# Patient Record
Sex: FEMALE | Race: WHITE | NOT HISPANIC OR LATINO | Employment: OTHER | URBAN - METROPOLITAN AREA
[De-identification: names, ages, dates, MRNs, and addresses within clinical notes are randomized per-mention and may not be internally consistent; named-entity substitution may affect disease eponyms.]

---

## 2018-03-20 LAB
25(OH)D3 SERPL-MCNC: 17.94 NG/ML
ALBUMIN SERPL BCP-MCNC: 4.4 G/DL (ref 3.5–5.7)
ALP SERPL-CCNC: 68 IU/L (ref 55–165)
ALT SERPL W P-5'-P-CCNC: 12 IU/L (ref 9–28)
ANION GAP SERPL CALCULATED.3IONS-SCNC: 13.1 MM/L
AST SERPL W P-5'-P-CCNC: 14 U/L (ref 7–26)
BACTERIA UR QL AUTO: ABNORMAL /HPF
BASOPHILS # BLD AUTO: 0.1 X3/UL (ref 0–0.3)
BASOPHILS # BLD AUTO: 0.9 % (ref 0–2)
BILIRUB SERPL-MCNC: 0.4 MG/DL (ref 0.3–1)
BILIRUB UR QL STRIP: NEGATIVE
BUN SERPL-MCNC: 30 MG/DL (ref 7–25)
CALCIUM SERPL-MCNC: 9.4 MG/DL (ref 8.6–10.5)
CHLORIDE SERPL-SCNC: 103 MM/L (ref 98–107)
CHOLEST SERPL-MCNC: 203 MG/DL (ref 0–200)
CLARITY UR: CLEAR
CO2 SERPL-SCNC: 27 MM/L (ref 21–31)
COLOR UR: YELLOW
CREAT SERPL-MCNC: 1.32 MG/DL (ref 0.6–1.2)
DEPRECATED RDW RBC AUTO: 13.8 %
EGFR (HISTORICAL): 39 GFR
EGFR AFRICAN AMERICAN (HISTORICAL): 47 GFR
EOSINOPHIL # BLD AUTO: 0.2 X3/UL (ref 0–0.5)
EOSINOPHIL NFR BLD AUTO: 2.5 % (ref 0–5)
EST. AVERAGE GLUCOSE BLD GHB EST-MCNC: 167 MG/DL
GLUCOSE (HISTORICAL): 197 MG/DL (ref 65–99)
GLUCOSE UR STRIP-MCNC: NEGATIVE MG/DL
HBA1C MFR BLD HPLC: 7.5 % (ref 4–6.2)
HCT VFR BLD AUTO: 39.7 % (ref 37–47)
HDLC SERPL-MCNC: 42 MG/DL (ref 40–60)
HGB BLD-MCNC: 13.2 G/DL (ref 12–16)
HGB UR QL STRIP.AUTO: NEGATIVE
KETONES UR STRIP-MCNC: NEGATIVE MG/DL
LDLC SERPL CALC-MCNC: 92.8 MG/DL (ref 75–193)
LEUKOCYTE ESTERASE UR QL STRIP: ABNORMAL
LYMPHOCYTES # BLD AUTO: 2.6 X3/UL (ref 1.2–4.2)
LYMPHOCYTES NFR BLD AUTO: 34.6 % (ref 20.5–51.1)
MCH RBC QN AUTO: 31.7 PG (ref 26–34)
MCHC RBC AUTO-ENTMCNC: 33.1 G/DL (ref 31–37)
MCV RBC AUTO: 95.6 FL (ref 81–99)
MONOCYTES # BLD AUTO: 0.6 X3/UL (ref 0–1)
MONOCYTES NFR BLD AUTO: 8.4 % (ref 1.7–12)
MUCUS THREADS (HISTORICAL): ABNORMAL
NEUTROPHILS # BLD AUTO: 4 X3/UL (ref 1.4–6.5)
NEUTS SEG NFR BLD AUTO: 53.6 % (ref 42.2–75.2)
NITRITE UR QL STRIP: NEGATIVE
NON-SQ EPI CELLS URNS QL MICRO: ABNORMAL /LPF
OSMOLALITY, SERUM (HISTORICAL): 289 MOSM (ref 262–291)
PH UR STRIP.AUTO: 5.5 [PH] (ref 4.5–8)
PLATELET # BLD AUTO: 333 X3/UL (ref 130–400)
PMV BLD AUTO: 8.9 FL
POTASSIUM SERPL-SCNC: 4.1 MM/L (ref 3.5–5.5)
PROT UR STRIP-MCNC: NEGATIVE MG/DL
RBC # BLD AUTO: 4.15 X6/UL (ref 3.9–5.2)
RBC #/AREA URNS AUTO: ABNORMAL /HPF
SODIUM SERPL-SCNC: 139 MM/L (ref 134–143)
SP GR UR STRIP.AUTO: >= 1.03 (ref 1–1.03)
T4 FREE SERPL-MCNC: 0.92 NG/DL (ref 0.6–1.7)
TOTAL PROTEIN (HISTORICAL): 7.2 G/DL (ref 6.4–8.9)
TRIGL SERPL-MCNC: 343 MG/DL (ref 44–166)
TSH SERPL DL<=0.05 MIU/L-ACNC: 1.26 UIU/M (ref 0.45–5.33)
UROBILINOGEN UR QL STRIP.AUTO: 0.2 EU/DL (ref 0.2–8)
VLDL CHOLESTEROL (HISTORICAL): 69 MG/DL (ref 5–51)
WBC # BLD AUTO: 7.5 X3/UL (ref 4.8–10.8)
WBC #/AREA URNS AUTO: ABNORMAL /HPF

## 2018-07-04 PROBLEM — E78.2 MIXED HYPERLIPIDEMIA: Status: ACTIVE | Noted: 2018-07-04

## 2018-07-04 PROBLEM — E66.9 OBESITY: Status: ACTIVE | Noted: 2018-07-04

## 2018-07-04 PROBLEM — Z98.61 CORONARY ANGIOPLASTY STATUS: Status: ACTIVE | Noted: 2018-07-04

## 2018-07-04 PROBLEM — I47.10 SUPRAVENTRICULAR TACHYCARDIA: Status: ACTIVE | Noted: 2018-07-04

## 2018-07-04 PROBLEM — I47.1 SUPRAVENTRICULAR TACHYCARDIA (HCC): Status: ACTIVE | Noted: 2018-07-04

## 2018-07-04 PROBLEM — E11.9 TYPE 2 DIABETES MELLITUS (HCC): Status: ACTIVE | Noted: 2018-07-04

## 2018-07-04 PROBLEM — I25.10 ATHEROSCLEROTIC HEART DISEASE OF NATIVE CORONARY ARTERY WITHOUT ANGINA PECTORIS: Status: ACTIVE | Noted: 2018-07-04

## 2018-07-04 PROBLEM — I10 ESSENTIAL (PRIMARY) HYPERTENSION: Status: ACTIVE | Noted: 2018-07-04

## 2018-07-04 RX ORDER — LISINOPRIL AND HYDROCHLOROTHIAZIDE 25; 20 MG/1; MG/1
1 TABLET ORAL DAILY
COMMUNITY

## 2018-07-04 RX ORDER — METOPROLOL SUCCINATE 25 MG/1
25 TABLET, EXTENDED RELEASE ORAL DAILY
COMMUNITY
End: 2018-07-09 | Stop reason: DRUGHIGH

## 2018-07-04 RX ORDER — GLYBURIDE-METFORMIN HYDROCHLORIDE 5; 500 MG/1; MG/1
1 TABLET ORAL 2 TIMES DAILY WITH MEALS
COMMUNITY

## 2018-07-09 ENCOUNTER — OFFICE VISIT (OUTPATIENT)
Dept: CARDIOLOGY CLINIC | Facility: CLINIC | Age: 78
End: 2018-07-09
Payer: MEDICARE

## 2018-07-09 VITALS
BODY MASS INDEX: 44.71 KG/M2 | HEIGHT: 58 IN | DIASTOLIC BLOOD PRESSURE: 80 MMHG | HEART RATE: 70 BPM | WEIGHT: 213 LBS | SYSTOLIC BLOOD PRESSURE: 130 MMHG

## 2018-07-09 DIAGNOSIS — Z98.61 CORONARY ANGIOPLASTY STATUS: ICD-10-CM

## 2018-07-09 DIAGNOSIS — I47.1 SUPRAVENTRICULAR TACHYCARDIA (HCC): ICD-10-CM

## 2018-07-09 DIAGNOSIS — I25.10 CORONARY ARTERY DISEASE INVOLVING NATIVE CORONARY ARTERY OF NATIVE HEART WITHOUT ANGINA PECTORIS: Primary | ICD-10-CM

## 2018-07-09 DIAGNOSIS — E78.2 MIXED HYPERLIPIDEMIA: ICD-10-CM

## 2018-07-09 PROCEDURE — 93000 ELECTROCARDIOGRAM COMPLETE: CPT | Performed by: INTERNAL MEDICINE

## 2018-07-09 PROCEDURE — 99214 OFFICE O/P EST MOD 30 MIN: CPT | Performed by: INTERNAL MEDICINE

## 2018-07-09 RX ORDER — METOPROLOL SUCCINATE 25 MG/1
12.5 TABLET, EXTENDED RELEASE ORAL DAILY
COMMUNITY

## 2018-07-09 NOTE — PATIENT INSTRUCTIONS
No changes  Coronary Artery Disease   WHAT YOU SHOULD KNOW:   Coronary artery disease (CAD) is narrowing of the arteries to your heart caused by a buildup of plaque  Plaque is made up of cholesterol and other substances  The narrowing in your arteries decreases the amount of blood that can flow to your heart  This causes your heart to get less oxygen  INSTRUCTIONS:   Medicines: You may  need any of the following:  · Blood pressure medicines  are given to lower your blood pressure  These medicines may include ACE inhibitors and beta-blockers  ACE inhibitors help keep your blood vessels relaxed and open, which helps keep blood flowing into your heart  Beta-blockers keep your heart pumping strongly and regularly  This helps keep your heart from working too hard to get oxygen  · Cholesterol-lowering medicines  help lower high blood cholesterol levels  · Nitrates , such as nitroglycerin, relax the arteries of your heart so it gets more oxygen  They help to relieve your chest pain  · Antiplatelet medicines , such as aspirin, keep platelets from sticking to a damaged part of your artery  Platelets are a part of your blood that stick together to help heal injuries  They may cause a blockage in your artery and keep blood from flowing to your heart  · Anticoagulants    are a type of blood thinner medicine that helps prevent clots  Clots can cause strokes, heart attacks, and death  These medicines may cause you to bleed or bruise more easily  ¨ Watch for bleeding from your gums or nose  Watch for blood in your urine and bowel movements  Use a soft washcloth and a soft toothbrush  If you shave, use an electric razor  Avoid activities that can cause bruising or bleeding  ¨ Tell your healthcare provider about all medicines you take because many medicines cannot be used with anticoagulants  Do not start or stop any medicines unless your healthcare provider tells you to   Tell your dentist and other healthcare providers that you take anticoagulants  Wear a bracelet or necklace that says you take this medicine  ¨ You will need regular blood tests so your healthcare provider can decide how much medicine you need  Take anticoagulants exactly as directed  Tell your healthcare provider right away if you forget to take the medicine, or if you take too much  ¨ If you take warfarin, some foods can change how your blood clots  Do not make major changes to your diet while you take warfarin  Warfarin works best when you eat about the same amount of vitamin K every day  Vitamin K is found in green leafy vegetables, broccoli, grapes, and other foods  Ask for more information about what to eat when you take warfarin  · Take your medicine as directed  Call your healthcare provider if you think your medicine is not helping or if you have side effects  Tell him if you are allergic to any medicine  Keep a list of the medicines, vitamins, and herbs you take  Include the amounts, and when and why you take them  Bring the list or the pill bottles to follow-up visits  Carry your medicine list with you in case of an emergency  Follow up with your primary healthcare provider or cardiologist as directed: You may need to return for other tests  You may also be referred to a cardiac surgeon  Write down your questions so you remember to ask them during your visits  Cardiac rehabilitation:  Your primary healthcare provider or cardiologist may recommend that you attend cardiac rehabilitation (rehab)  This is a program run by specialists who will help you safely strengthen your heart and reduce the risk of more heart disease  The plan includes exercise, relaxation, stress management, and heart-healthy nutrition  Caregivers will also check to make sure any medicines you are taking are working  Manage CAD:   · Exercise regularly  Exercise at least 30 minutes per day, on most days of the week   Exercise helps to lower high cholesterol and high blood pressure  It can also help you to maintain a healthy weight  Ask your primary healthcare provider about the kind of exercise you should do and how to get started  · Maintain a healthy weight  If you are overweight, talk to your primary healthcare provider about how to lose weight  A weight loss of 10% can improve your heart health  · Eat heart-healthy foods  Include fresh fruits and vegetables in your meal plan  Choose low-fat foods, such as skim or 1% fat milk, low-fat cheese and yogurt, fish, chicken (without skin), and lean meats  Eat two 4-ounce servings of fish high in omega-3 fats each week, such as salmon, fresh tuna, and herring  Avoid foods that are high in sodium, such as canned foods, potato chips, salty snacks, and cold cuts  Put less table salt on your food  · Do not smoke  Smoking increases your risk of a heart attack  If you smoke, it is never too late to quit  Ask primary healthcare provider for information if you need help quitting  · Limit alcohol  Women should limit alcohol to 1 drink a day  Men should limit alcohol to 2 drinks a day  A drink of alcohol is 12 ounces of beer, 5 ounces of wine, or 1½ ounces of liquor  · Manage other health conditions  Follow your primary healthcare provider's advice on how to manage other conditions that can affect your heart health  These include diabetes, high blood pressure, and high cholesterol  You may need to take medicines for these conditions and make other lifestyle changes  Talk to your primary healthcare provider if you are depressed  He may recommend treatment for your depression  · Ask if you should have a flu vaccine  The flu can be dangerous for a person who has CAD  The flu vaccine is available every year in the fall         Contact your primary healthcare provider if:   · You have chest pain that is more frequent, or you have chest pain at rest      · You have questions or concerns about your condition or care  Return to the emergency department if:  You have any of the following signs of a heart attack:  · Squeezing, pressure, fullness, or pain in your chest that lasts longer than a few minutes or returns     · Discomfort or pain in your back, neck, jaw, stomach, or arm    · Shortness of breath or breathing problems    · A sudden cold sweat, lightheadedness, dizziness, or nausea, especially with chest pain or trouble breathing  © 2014 [address] is for End User's use only and may not be sold, redistributed or otherwise used for commercial purposes  All illustrations and images included in CareNotes® are the copyrighted property of A [employer] A M [employer]  or [employer]ian [employer]  The above information is an  only  It is not intended as medical advice for individual conditions or treatments  Talk to your doctor, nurse or pharmacist before following any medical regimen to see if it is safe and effective for you

## 2018-07-09 NOTE — PROGRESS NOTES
Subjective:        Patient ID: Renate Rubio is a 78 y o  female  Chief Complaint:  I am happy to report that Mariya Roldan is feeling well not having had any alarming cardiac symptoms since our last visit  She does all of her ADLs, including carrying groceries, carrying laundry, and vacuuming her house without any chest pain, dyspnea, palpitations, presyncope nor syncope  Her EKG is stable, sinus rhythm, leftward axis with a single PAC, no alarming ST changes  She tells me recently put her on new anti-lipid therapy, she cannot recall the name, I saw that her triglycerides are elevated on recent profile  She is trying to watch her dairy and diet a little bit better as well  Coronary Artery Disease   Symptoms include palpitations  Pertinent negatives include no chest pain, dizziness, leg swelling, muscle weakness, shortness of breath or weight gain  Risk factors include hyperlipidemia  Rapid Heart Rate    Pertinent negatives include no anxiety, chest pain, coughing, diaphoresis, dizziness, fever, irregular heartbeat, malaise/fatigue, nausea, near-syncope, numbness, shortness of breath, syncope, vomiting or weakness  Hypertension   Associated symptoms include palpitations  Pertinent negatives include no blurred vision, chest pain, headaches, malaise/fatigue, orthopnea, PND or shortness of breath  Hyperlipidemia   Pertinent negatives include no chest pain, focal weakness, myalgias or shortness of breath  The following portions of the patient's history were reviewed and updated as appropriate: allergies, current medications, past family history, past medical history, past social history, past surgical history and problem list   Review of Systems   Constitution: Negative  Negative for diaphoresis, fever, weakness, malaise/fatigue, night sweats, weight gain and weight loss  HENT: Negative  Negative for congestion, nosebleeds, stridor and tinnitus  Eyes: Negative    Negative for blurred vision, double vision, photophobia, vision loss in left eye, vision loss in right eye and visual disturbance  Cardiovascular: Positive for palpitations  Negative for chest pain, claudication, cyanosis, dyspnea on exertion, irregular heartbeat, leg swelling, near-syncope, orthopnea, paroxysmal nocturnal dyspnea and syncope  Respiratory: Negative  Negative for cough, hemoptysis, shortness of breath, sleep disturbances due to breathing, snoring, sputum production and wheezing  Endocrine: Negative  Negative for cold intolerance and heat intolerance  Hematologic/Lymphatic: Negative  Negative for adenopathy and bleeding problem  Does not bruise/bleed easily  Skin: Negative  Negative for color change, dry skin, flushing, itching, nail changes and rash  Musculoskeletal: Positive for arthritis and joint pain  Negative for back pain, muscle cramps, muscle weakness, myalgias and stiffness  She has a little bit of difficulty with steps, she attributes to age an arthritic issues  Gastrointestinal: Negative  Negative for bloating, abdominal pain, anorexia, bowel incontinence, constipation, diarrhea, dysphagia, excessive appetite, heartburn, hematemesis, hematochezia, melena, nausea and vomiting  Genitourinary: Negative  Negative for bladder incontinence, hematuria, nocturia, non-menstrual bleeding and urgency  Neurological: Negative  Negative for excessive daytime sleepiness, dizziness, focal weakness, headaches, light-headedness, loss of balance, numbness, paresthesias and tremors  Psychiatric/Behavioral: Negative  Negative for altered mental status, depression, memory loss and substance abuse  The patient is not nervous/anxious  Allergic/Immunologic: Negative  Negative for environmental allergies and hives  Objective:     Physical Exam   Constitutional: She is oriented to person, place, and time  She appears well-developed and well-nourished  HENT:   Head: Normocephalic and atraumatic  Eyes: EOM are normal  Pupils are equal, round, and reactive to light  Neck: Normal range of motion  Neck supple  No JVD present  No thyromegaly present  Cardiovascular: Normal rate, regular rhythm, normal heart sounds and intact distal pulses  Exam reveals no gallop and no friction rub  No murmur heard  Pulmonary/Chest: Effort normal and breath sounds normal  No stridor  No respiratory distress  She has no wheezes  She has no rales  Abdominal: Soft  Bowel sounds are normal  She exhibits no mass  There is no tenderness  Musculoskeletal: Normal range of motion  She exhibits no edema  Lymphadenopathy:     She has no cervical adenopathy  Neurological: She is alert and oriented to person, place, and time  Skin: Skin is warm and dry  No rash noted  No pallor  Psychiatric: She has a normal mood and affect  Her behavior is normal  Judgment and thought content normal    Nursing note and vitals reviewed  Lab Review:   No visits with results within 2 Month(s) from this visit     Latest known visit with results is:   Orders Only on 03/20/2018   Component Date Value    WBC 03/20/2018 7 5     RBC 03/20/2018 4 15     Hemoglobin 03/20/2018 13 2     Hematocrit 03/20/2018 39 7     MCV 03/20/2018 95 6     MCH 03/20/2018 31 7     MCHC 03/20/2018 33 1     RDW 03/20/2018 13 8     Platelets 16/93/8847 333     MPV 03/20/2018 8 9     Neutrophils Relative 03/20/2018 53 6     Lymphocytes Relative 03/20/2018 34 6     Monocytes Relative 03/20/2018 8 4     Eosinophils Relative 03/20/2018 2 5     Basophils Relative 03/20/2018 0 9     Neutrophils Absolute 03/20/2018 4 0     Lymphocytes Absolute 03/20/2018 2 6     Monocytes Absolute 03/20/2018 0 6     Eosinophils Absolute 03/20/2018 0 2     Basophils Absolute 03/20/2018 0 1     Color, UA 03/20/2018 YELLOW     Clarity, UA 03/20/2018 CLEAR     Specific Gravity, UA 03/20/2018 >= 1 030     pH, UA 03/20/2018 5 5     Glucose, UA 03/20/2018 NEGATIVE     Bilirubin, UA 03/20/2018 NEGATIVE     Ketones, UA 03/20/2018 NEGATIVE     Protein, UA 03/20/2018 NEGATIVE     Urobilinogen, UA 03/20/2018 0 2     Nitrite, UA 03/20/2018 NEGATIVE     Blood, UA 03/20/2018 NEGATIVE     Leukocytes, UA 03/20/2018 TRACE*    WBC, UA 03/20/2018 0 - 2     RBC, UA 03/20/2018 0 - 2     Bacteria, UA 03/20/2018 FEW*    MUCUS THREADS (HISTORICA* 03/20/2018 NONE SEEN     Epithelial Cells 03/20/2018 FEW     Glucose 03/20/2018 197*    BUN 03/20/2018 30*    Creatinine 03/20/2018 1 32*    Sodium 03/20/2018 139     Potassium 03/20/2018 4 1     Chloride 03/20/2018 103     CO2 03/20/2018 27     Calcium 03/20/2018 9 4     Anion Gap 03/20/2018 13 1     OSMOLALITY, SERUM 03/20/2018 289     Total Protein 03/20/2018 7 2     Albumin 03/20/2018 4 4     Total Bilirubin 03/20/2018 0 4     AST 03/20/2018 14     ALT 03/20/2018 12     Alkaline Phosphatase 03/20/2018 68     EGFR (HISTORICAL) 03/20/2018 39*    eGFR  03/20/2018 47*    Cholesterol 03/20/2018 203*    Triglycerides 03/20/2018 343*    VLDL CHOLESTEROL 03/20/2018 69 0*    HDL 03/20/2018 42     LDL Calculated 03/20/2018 92 8     Hemoglobin A1C 03/20/2018 7 5*    EAG 03/20/2018 167     TSH 3RD GENERATON 03/20/2018 1 26     Free T4 03/20/2018 0 92     Vit D, 25-Hydroxy 03/20/2018 17 94          Assessment:       1  Coronary artery disease involving native coronary artery of native heart without angina pectoris  POCT ECG   2  Coronary angioplasty status     3  Mixed hyperlipidemia     4  Supraventricular tachycardia ([address] )          Plan:       1  Danni Ruiz had single-vessel CAD via 2010 catheterization receiving an LAD stent and kissing balloon diagonal angioplasty and has done well since  She is angina free without any signs or symptoms of heart failure nor electrical instability  Medications ideal, I am glad you have prescribed something for her dyslipidemia though her LDLs are not bad      2   There is no evidence for recurrent SVT symptomatic we or electrocardiographically, I do not advise any changes here  We briefly visited surveillance stress testing, we would both like to hold off at this point time however if any elective surgery need be scheduled I would advocate preoperative stress testing should anything be of intermediate or higher risk  Her meds seem optimal, I made no changes today and order no testing today  She will give a call should any cardiac symptoms such as progressive dyspnea, of course any chest pain or tightness or extreme fatigue arise prior to her 6 month follow-up visit with me    Let me know if you need me to see her sooner

## 2019-02-04 ENCOUNTER — TRANSCRIBE ORDERS (OUTPATIENT)
Dept: ADMINISTRATIVE | Facility: HOSPITAL | Age: 79
End: 2019-02-04

## 2019-02-04 ENCOUNTER — APPOINTMENT (OUTPATIENT)
Dept: LAB | Facility: HOSPITAL | Age: 79
End: 2019-02-04
Attending: INTERNAL MEDICINE
Payer: COMMERCIAL

## 2019-02-04 DIAGNOSIS — E55.9 VITAMIN D DEFICIENCY: Primary | ICD-10-CM

## 2019-02-04 DIAGNOSIS — E78.89 LIPIDS ABNORMAL: ICD-10-CM

## 2019-02-04 DIAGNOSIS — Z79.4 TYPE 2 DIABETES MELLITUS WITHOUT COMPLICATION, WITH LONG-TERM CURRENT USE OF INSULIN (HCC): ICD-10-CM

## 2019-02-04 DIAGNOSIS — E11.9 TYPE 2 DIABETES MELLITUS WITHOUT COMPLICATION, WITH LONG-TERM CURRENT USE OF INSULIN (HCC): ICD-10-CM

## 2019-02-04 DIAGNOSIS — E55.9 VITAMIN D DEFICIENCY: ICD-10-CM

## 2019-02-04 DIAGNOSIS — I10 HYPERTENSION, UNSPECIFIED TYPE: ICD-10-CM

## 2019-02-04 LAB
25(OH)D3 SERPL-MCNC: 16.5 NG/ML (ref 30–100)
ALBUMIN SERPL BCP-MCNC: 4.4 G/DL (ref 3.5–5.7)
ALP SERPL-CCNC: 67 U/L (ref 55–165)
ALT SERPL W P-5'-P-CCNC: 10 U/L (ref 7–52)
ANION GAP SERPL CALCULATED.3IONS-SCNC: 12 MMOL/L (ref 4–13)
AST SERPL W P-5'-P-CCNC: 14 U/L (ref 13–39)
BACTERIA UR QL AUTO: ABNORMAL /HPF
BASOPHILS # BLD AUTO: 0.1 THOUSANDS/ΜL (ref 0–0.1)
BASOPHILS NFR BLD AUTO: 1 % (ref 0–1)
BILIRUB SERPL-MCNC: 0.3 MG/DL (ref 0.2–1)
BILIRUB UR QL STRIP: NEGATIVE
BUN SERPL-MCNC: 39 MG/DL (ref 7–25)
CALCIUM SERPL-MCNC: 9.9 MG/DL (ref 8.6–10.5)
CHLORIDE SERPL-SCNC: 105 MMOL/L (ref 98–107)
CHOLEST SERPL-MCNC: 216 MG/DL (ref 0–200)
CLARITY UR: ABNORMAL
CO2 SERPL-SCNC: 23 MMOL/L (ref 21–31)
COLOR UR: YELLOW
CREAT SERPL-MCNC: 1.26 MG/DL (ref 0.6–1.2)
CREAT UR-MCNC: 132 MG/DL
EOSINOPHIL # BLD AUTO: 0.2 THOUSAND/ΜL (ref 0–0.61)
EOSINOPHIL NFR BLD AUTO: 2 % (ref 0–6)
ERYTHROCYTE [DISTWIDTH] IN BLOOD BY AUTOMATED COUNT: 14.1 % (ref 11.6–15.1)
EST. AVERAGE GLUCOSE BLD GHB EST-MCNC: 186 MG/DL
GFR SERPL CREATININE-BSD FRML MDRD: 41 ML/MIN/1.73SQ M
GLUCOSE P FAST SERPL-MCNC: 120 MG/DL (ref 65–99)
GLUCOSE UR STRIP-MCNC: NEGATIVE MG/DL
HBA1C MFR BLD: 8.1 % (ref 4.2–6.3)
HCT VFR BLD AUTO: 40.7 % (ref 37–47)
HDLC SERPL-MCNC: 46 MG/DL (ref 40–60)
HGB BLD-MCNC: 13.1 G/DL (ref 11.5–15.4)
HGB UR QL STRIP.AUTO: ABNORMAL
KETONES UR STRIP-MCNC: NEGATIVE MG/DL
LEUKOCYTE ESTERASE UR QL STRIP: ABNORMAL
LYMPHOCYTES # BLD AUTO: 3.3 THOUSANDS/ΜL (ref 0.6–4.47)
LYMPHOCYTES NFR BLD AUTO: 40 % (ref 14–44)
MCH RBC QN AUTO: 30.8 PG (ref 26.8–34.3)
MCHC RBC AUTO-ENTMCNC: 32.3 G/DL (ref 31.4–37.4)
MCV RBC AUTO: 95 FL (ref 82–98)
MICROALBUMIN UR-MCNC: 41.9 MG/L (ref 0–20)
MICROALBUMIN/CREAT 24H UR: 32 MG/G CREATININE (ref 0–30)
MONOCYTES # BLD AUTO: 0.7 THOUSAND/ΜL (ref 0.17–1.22)
MONOCYTES NFR BLD AUTO: 9 % (ref 4–12)
MUCOUS THREADS UR QL AUTO: ABNORMAL
NEUTROPHILS # BLD AUTO: 4 THOUSANDS/ΜL (ref 1.85–7.62)
NEUTS SEG NFR BLD AUTO: 48 % (ref 43–75)
NITRITE UR QL STRIP: POSITIVE
NON-SQ EPI CELLS URNS QL MICRO: ABNORMAL /HPF
NONHDLC SERPL-MCNC: 170 MG/DL
NRBC BLD AUTO-RTO: 0 /100 WBCS
PH UR STRIP.AUTO: 5.5 [PH] (ref 5–8)
PLATELET # BLD AUTO: 317 THOUSANDS/UL (ref 149–390)
PMV BLD AUTO: 8.4 FL (ref 8.9–12.7)
POTASSIUM SERPL-SCNC: 4.5 MMOL/L (ref 3.5–5.5)
PROT SERPL-MCNC: 7.1 G/DL (ref 6.4–8.9)
PROT UR STRIP-MCNC: NEGATIVE MG/DL
RBC # BLD AUTO: 4.27 MILLION/UL (ref 3.81–5.12)
RBC #/AREA URNS AUTO: ABNORMAL /HPF
SODIUM SERPL-SCNC: 140 MMOL/L (ref 134–143)
SP GR UR STRIP.AUTO: >=1.03 (ref 1–1.03)
T4 FREE SERPL-MCNC: 1.03 NG/DL (ref 0.76–1.46)
TRIGL SERPL-MCNC: 515 MG/DL (ref 44–166)
TSH SERPL DL<=0.05 MIU/L-ACNC: 1.02 UIU/ML (ref 0.45–5.33)
UROBILINOGEN UR QL STRIP.AUTO: 0.2 E.U./DL
VIT B12 SERPL-MCNC: 145 PG/ML (ref 100–900)
WBC # BLD AUTO: 8.2 THOUSAND/UL (ref 4.31–10.16)
WBC #/AREA URNS AUTO: ABNORMAL /HPF

## 2019-02-04 PROCEDURE — 80053 COMPREHEN METABOLIC PANEL: CPT

## 2019-02-04 PROCEDURE — 82306 VITAMIN D 25 HYDROXY: CPT

## 2019-02-04 PROCEDURE — 85025 COMPLETE CBC W/AUTO DIFF WBC: CPT

## 2019-02-04 PROCEDURE — 84439 ASSAY OF FREE THYROXINE: CPT

## 2019-02-04 PROCEDURE — 82607 VITAMIN B-12: CPT

## 2019-02-04 PROCEDURE — 84443 ASSAY THYROID STIM HORMONE: CPT

## 2019-02-04 PROCEDURE — 80061 LIPID PANEL: CPT

## 2019-02-04 PROCEDURE — 82043 UR ALBUMIN QUANTITATIVE: CPT

## 2019-02-04 PROCEDURE — 81001 URINALYSIS AUTO W/SCOPE: CPT | Performed by: INTERNAL MEDICINE

## 2019-02-04 PROCEDURE — 83036 HEMOGLOBIN GLYCOSYLATED A1C: CPT

## 2019-02-04 PROCEDURE — 36415 COLL VENOUS BLD VENIPUNCTURE: CPT

## 2019-02-04 PROCEDURE — 82570 ASSAY OF URINE CREATININE: CPT

## 2019-05-06 ENCOUNTER — TRANSCRIBE ORDERS (OUTPATIENT)
Dept: ADMINISTRATIVE | Facility: HOSPITAL | Age: 79
End: 2019-05-06

## 2019-05-06 DIAGNOSIS — M51.26 LUMBAR DISC HERNIATION: Primary | ICD-10-CM

## 2019-05-14 ENCOUNTER — HOSPITAL ENCOUNTER (OUTPATIENT)
Dept: MRI IMAGING | Facility: HOSPITAL | Age: 79
Discharge: HOME | End: 2019-05-14
Attending: ORTHOPAEDIC SURGERY
Payer: COMMERCIAL

## 2019-05-14 DIAGNOSIS — M51.26 LUMBAR DISC HERNIATION: ICD-10-CM

## 2019-05-14 PROCEDURE — 72148 MRI LUMBAR SPINE W/O DYE: CPT

## 2019-05-24 ENCOUNTER — OFFICE VISIT (OUTPATIENT)
Dept: CARDIOLOGY CLINIC | Facility: CLINIC | Age: 79
End: 2019-05-24
Payer: COMMERCIAL

## 2019-05-24 VITALS
WEIGHT: 205 LBS | HEART RATE: 72 BPM | BODY MASS INDEX: 43.03 KG/M2 | SYSTOLIC BLOOD PRESSURE: 148 MMHG | DIASTOLIC BLOOD PRESSURE: 82 MMHG | HEIGHT: 58 IN

## 2019-05-24 DIAGNOSIS — I47.1 SUPRAVENTRICULAR TACHYCARDIA (HCC): ICD-10-CM

## 2019-05-24 DIAGNOSIS — I10 ESSENTIAL (PRIMARY) HYPERTENSION: ICD-10-CM

## 2019-05-24 DIAGNOSIS — Z98.61 CORONARY ANGIOPLASTY STATUS: Primary | ICD-10-CM

## 2019-05-24 DIAGNOSIS — E78.2 MIXED HYPERLIPIDEMIA: ICD-10-CM

## 2019-05-24 PROCEDURE — 99214 OFFICE O/P EST MOD 30 MIN: CPT | Performed by: INTERNAL MEDICINE

## 2019-05-24 RX ORDER — INSULIN GLARGINE 100 [IU]/ML
INJECTION, SOLUTION SUBCUTANEOUS
COMMUNITY
Start: 2019-05-14

## 2019-05-24 RX ORDER — GLIPIZIDE 5 MG/1
5 TABLET ORAL 2 TIMES DAILY
COMMUNITY
Start: 2019-05-20

## 2019-05-24 RX ORDER — SIMVASTATIN 40 MG
40 TABLET ORAL DAILY
COMMUNITY
Start: 2019-04-16 | End: 2021-06-18 | Stop reason: ALTCHOICE

## 2019-07-08 ENCOUNTER — HOSPITAL ENCOUNTER (OUTPATIENT)
Dept: NON INVASIVE DIAGNOSTICS | Facility: CLINIC | Age: 79
Discharge: HOME | End: 2019-07-08
Payer: COMMERCIAL

## 2019-07-08 DIAGNOSIS — I47.1 SUPRAVENTRICULAR TACHYCARDIA (HCC): ICD-10-CM

## 2019-07-08 DIAGNOSIS — Z98.61 CORONARY ANGIOPLASTY STATUS: ICD-10-CM

## 2019-07-08 DIAGNOSIS — I10 ESSENTIAL (PRIMARY) HYPERTENSION: ICD-10-CM

## 2019-07-08 PROCEDURE — 93306 TTE W/DOPPLER COMPLETE: CPT | Performed by: INTERNAL MEDICINE

## 2019-07-08 PROCEDURE — 93306 TTE W/DOPPLER COMPLETE: CPT

## 2020-02-10 ENCOUNTER — OFFICE VISIT (OUTPATIENT)
Dept: CARDIOLOGY CLINIC | Facility: CLINIC | Age: 80
End: 2020-02-10
Payer: COMMERCIAL

## 2020-02-10 VITALS
WEIGHT: 208 LBS | SYSTOLIC BLOOD PRESSURE: 124 MMHG | DIASTOLIC BLOOD PRESSURE: 72 MMHG | HEIGHT: 58 IN | HEART RATE: 75 BPM | BODY MASS INDEX: 43.66 KG/M2

## 2020-02-10 DIAGNOSIS — E78.2 MIXED HYPERLIPIDEMIA: ICD-10-CM

## 2020-02-10 DIAGNOSIS — I10 ESSENTIAL (PRIMARY) HYPERTENSION: ICD-10-CM

## 2020-02-10 DIAGNOSIS — I47.1 SUPRAVENTRICULAR TACHYCARDIA (HCC): ICD-10-CM

## 2020-02-10 DIAGNOSIS — Z95.5 H/O HEART ARTERY STENT: ICD-10-CM

## 2020-02-10 DIAGNOSIS — I25.10 CORONARY ARTERY DISEASE INVOLVING NATIVE CORONARY ARTERY OF NATIVE HEART WITHOUT ANGINA PECTORIS: Primary | ICD-10-CM

## 2020-02-10 PROCEDURE — 99214 OFFICE O/P EST MOD 30 MIN: CPT | Performed by: INTERNAL MEDICINE

## 2020-02-10 PROCEDURE — 93000 ELECTROCARDIOGRAM COMPLETE: CPT | Performed by: INTERNAL MEDICINE

## 2020-02-10 RX ORDER — ICOSAPENT ETHYL 1000 MG/1
1 CAPSULE ORAL 2 TIMES DAILY
Qty: 180 CAPSULE | Refills: 3 | Status: SHIPPED | OUTPATIENT
Start: 2020-02-10 | End: 2021-06-18 | Stop reason: SDUPTHER

## 2020-02-10 NOTE — PATIENT INSTRUCTIONS
Coronary Artery Disease   AMBULATORY CARE:   Coronary artery disease (CAD)  is narrowing of the arteries to your heart caused by a buildup of plaque  Plaque is made up of cholesterol and other substances  The narrowing in your arteries decreases the amount of blood that can flow to your heart  This causes your heart to get less oxygen  You may not have any symptoms of CAD  It is important for you to manage CAD even if you feel well  CAD can lead to a heart attack if it is not managed  Common symptoms include the following:   · Chest pain (angina), causing burning, squeezing, or crushing tightness in your chest    · Pain that spreads to your neck, jaw, or shoulder blade    · Nausea, vomiting, sweating, fainting, and hands and feet that are cold to the touch  Call 911 for any of the following:   · You have any of the following signs of a heart attack:      ¨ Squeezing, pressure, or pain in your chest that lasts longer than 5 minutes or returns    ¨ Discomfort or pain in your back, neck, jaw, stomach, or arm     ¨ Trouble breathing    ¨ Nausea or vomiting    ¨ Lightheadedness or a sudden cold sweat, especially with chest pain or trouble breathing    Contact your healthcare provider if:   · You have chest pain that is more frequent, or you have chest pain at rest     · You have questions or concerns about your condition or care  Medicines used to treat CAD:   · Blood pressure medicines  are given to lower your blood pressure  ACE inhibitors help keep your blood vessels relaxed and open to help keep blood flowing into your heart  Beta-blockers keep your heart pumping strongly and regularly so it does not work too hard to get oxygen  · Cholesterol medicines  help lower blood cholesterol levels  · Nitrates , such as nitroglycerin, relax the arteries of your heart so it gets more oxygen  They help to relieve your chest pain  · Antiplatelets , such as aspirin, help prevent blood clots   Take your antiplatelet medicine exactly as directed  These medicines make it more likely for you to bleed or bruise  If you are told to take aspirin, do not take acetaminophen or ibuprofen instead  · Blood thinners  keep clots from forming in your blood  Clots may cause heart attacks, strokes, or death  This medicine makes it more likely for you to bleed or bruise  · Do not take certain medicines without asking your healthcare provider first   These include NSAIDs, herbal or vitamin supplements, or hormones (estrogen or progestin)  Procedures used to treat CAD:   · Angioplasty  may be done to open an artery blocked by plaque  A tube with a balloon on the end is threaded into the blocked artery  Once the tube is in the artery, the balloon is inflated  As the balloon inflates, it presses the plaque against the artery wall to open the artery  A stent may be placed in your artery to keep it open  · Coronary artery bypass graft surgery (CABG)  is open heart surgery  Healthcare providers take arteries or veins from other areas in your body and use them to bypass or go around the blocked arteries of your heart  Cardiac rehabilitation:  Your healthcare provider may recommend that you attend cardiac rehabilitation (rehab)  This is a program run by specialists who will help you safely strengthen your heart and reduce the risk for more heart disease  The plan includes exercise, relaxation, stress management, and heart-healthy nutrition  Healthcare providers will also check to make sure any medicines you are taking are working  Manage CAD to prevent a heart attack:   · Do not smoke  Nicotine and other chemicals in cigarettes and cigars can cause heart and lung damage  Ask your healthcare provider for information if you currently smoke and need help to quit  E-cigarettes or smokeless tobacco still contain nicotine  Talk to your healthcare provider before you use these products  · Exercise regularly    Exercise at least 30 minutes each day, on most days of the week  Exercise helps to lower high cholesterol and high blood pressure  It can also help you maintain a healthy weight  Ask your healthcare provider about the kind of exercise you should do and how to get started  · Maintain a healthy weight  If you are overweight, talk to your healthcare provider about how to lose weight  A weight loss of 10% can improve your heart health  · Eat heart-healthy foods  Include fresh fruits and vegetables in your meal plan  Choose low-fat foods, such as skim or 1% fat milk, low-fat cheese and yogurt, fish, chicken (without skin), and lean meats  Eat two 4-ounce servings of fish high in omega-3 fats each week, such as salmon, fresh tuna, and herring  Do not eat foods that are high in sodium, such as canned foods, potato chips, salty snacks, and cold cuts  Put less table salt on your food  · Limit or do not drink alcohol  A drink of alcohol is 12 ounces of beer, 5 ounces of wine, or 1½ ounces of liquor  · Manage other health conditions  Follow your healthcare provider's advice on how to manage other conditions that can affect your heart health  These include diabetes, high blood pressure, and high cholesterol  You may need to take medicines for these conditions and make other lifestyle changes  · Ask if you should have a flu vaccine  The flu can be dangerous for a person who has CAD  The flu vaccine is available every year in the fall  Follow up with your healthcare provider as directed: You may need to return for other tests  You may also be referred to a cardiac surgeon  Write down your questions so you remember to ask them during your visits  © 2017 [address]  Information is for End User's use only and may not be sold, redistributed or otherwise used for commercial purposes  All illustrations and images included in CareNotes® are the copyrighted property of [employer]  or [employer]    The above information is an  only  It is not intended as medical advice for individual conditions or treatments  Talk to your doctor, nurse or pharmacist before following any medical regimen to see if it is safe and effective for you

## 2020-02-10 NOTE — PROGRESS NOTES
Subjective:        Patient ID: Kelly Conn is a 70 y o  female  Chief Complaint:  Mason Marcelo is here for routine follow-up  As you know she has a history of CAD status post remote LAD stenting and kissing angioplasty  She remains angina free  She says she thinks the that Vascepa I prescribed is now only 9 bucks for 3 months  For unclear reasons she did not fill it  Asked me to reorder it  EKG shows normal sinus rhythm leftward axis otherwise unremarkable 75 beats per minute  She examines euvolemic  She has a grade 1 outflow murmur  Reviewed her echocardiogram from the summer revealing trivial aortic stenosis consistent with her heard murmur, and normal to hyperdynamic LV systolic function and no wall motion abnormality  As you know her triglycerides are over 500  Recent A1c improved nearing 7 now  The following portions of the patient's history were reviewed and updated as appropriate: allergies, current medications, past family history, past medical history, past social history, past surgical history and problem list   Review of Systems   Constitution: Negative for chills, diaphoresis, malaise/fatigue and weight gain  HENT: Negative for nosebleeds and stridor  Eyes: Negative for double vision, vision loss in left eye, vision loss in right eye and visual disturbance  Cardiovascular: Negative for chest pain, claudication, cyanosis, dyspnea on exertion, irregular heartbeat, leg swelling, near-syncope, orthopnea, palpitations, paroxysmal nocturnal dyspnea and syncope  Respiratory: Negative for cough, shortness of breath, snoring and wheezing  Endocrine: Negative for polydipsia, polyphagia and polyuria  Hematologic/Lymphatic: Negative for bleeding problem  Does not bruise/bleed easily  Skin: Negative for flushing and rash  Musculoskeletal: Positive for arthritis and stiffness  Negative for falls and myalgias     Gastrointestinal: Negative for abdominal pain, heartburn, hematemesis, hematochezia, melena and nausea  Genitourinary: Negative for hematuria  Neurological: Negative for brief paralysis, dizziness, focal weakness, headaches, light-headedness, loss of balance and vertigo  Psychiatric/Behavioral: Negative for altered mental status and substance abuse  Allergic/Immunologic: Negative for hives  Objective:      /72   Pulse 75   Ht 4' 10" (1 473 m)   Wt 94 3 kg (208 lb)   BMI 43 47 kg/m²   Physical Exam   Constitutional: She is oriented to person, place, and time  She appears well-developed and well-nourished  HENT:   Head: Normocephalic and atraumatic  Eyes: Pupils are equal, round, and reactive to light  EOM are normal    Neck: Normal range of motion  Neck supple  No JVD present  No thyromegaly present  Cardiovascular: Normal rate, regular rhythm and intact distal pulses  Exam reveals no gallop and no friction rub  Murmur (Grade 1 outflow murmur short length at base) heard  Pulmonary/Chest: Effort normal and breath sounds normal  No stridor  No respiratory distress  She has no wheezes  She has no rales  Abdominal: Soft  Bowel sounds are normal  She exhibits no mass  There is no tenderness  Musculoskeletal: Normal range of motion  She exhibits no edema  Lymphadenopathy:     She has no cervical adenopathy  Neurological: She is alert and oriented to person, place, and time  Skin: Skin is warm and dry  No rash noted  No pallor  Psychiatric: She has a normal mood and affect  Her behavior is normal  Judgment and thought content normal    Nursing note and vitals reviewed  Lab Review:   No visits with results within 6 Month(s) from this visit     Latest known visit with results is:   Appointment on 02/04/2019   Component Date Value    WBC 02/04/2019 8 20     RBC 02/04/2019 4 27     Hemoglobin 02/04/2019 13 1     Hematocrit 02/04/2019 40 7     MCV 02/04/2019 95     [address] 02/04/2019 30 8     MCHC 02/04/2019 32 3     RDW 02/04/2019 14 1     MPV 02/04/2019 8 4*    Platelets 53/78/2550 317     nRBC 02/04/2019 0     Neutrophils Relative 02/04/2019 48     Lymphocytes Relative 02/04/2019 40     Monocytes Relative 02/04/2019 9     Eosinophils Relative 02/04/2019 2     Basophils Relative 02/04/2019 1     Neutrophils Absolute 02/04/2019 4 00     Lymphocytes Absolute 02/04/2019 3 30     Monocytes Absolute 02/04/2019 0 70     Eosinophils Absolute 02/04/2019 0 20     Basophils Absolute 02/04/2019 0 10     Sodium 02/04/2019 140     Potassium 02/04/2019 4 5     Chloride 02/04/2019 105     CO2 02/04/2019 23     ANION GAP 02/04/2019 12     BUN 02/04/2019 39*    Creatinine 02/04/2019 1 26*    Glucose, Fasting 02/04/2019 120*    Calcium 02/04/2019 9 9     AST 02/04/2019 14     ALT 02/04/2019 10     Alkaline Phosphatase 02/04/2019 67     Total Protein 02/04/2019 7 1     Albumin 02/04/2019 4 4     Total Bilirubin 02/04/2019 0 30     eGFR 02/04/2019 41     Cholesterol 02/04/2019 216*    Triglycerides 02/04/2019 515*    HDL, Direct 02/04/2019 46     LDL Calculated 02/04/2019      Non-HDL-Chol (CHOL-HDL) 02/04/2019 170     Free T4 02/04/2019 1 03     TSH 3RD GENERATON 02/04/2019 1 020     Hemoglobin A1C 02/04/2019 8 1*    EAG 02/04/2019 186     Vit D, 25-Hydroxy 02/04/2019 16 5*    Creatinine, Ur 02/04/2019 132 0     Microalbum  ,U,Random 02/04/2019 41 9*    Microalb Creat Ratio 02/04/2019 32*    Vitamin B-12 02/04/2019 145      No results found  Assessment:       1  Coronary artery disease involving native coronary artery of native heart without angina pectoris  POCT ECG    Icosapent Ethyl (VASCEPA) 1 g CAPS    Triglycerides   2  H/O heart artery stent     3  Supraventricular tachycardia ([address] )     4  Mixed hyperlipidemia  Icosapent Ethyl (VASCEPA) 1 g CAPS    Triglycerides   5   Essential (primary) hypertension          Plan:       Chronic is without any signs or symptoms reminiscent of angina pectoris, heart failure nor electrical instability  Blood pressure well controlled  I represcribed Vascepa again  She will call with any side effects and stop it  Fasting triglycerides ordered in approximately 2 months time  Remainder of her cardiac regimen reviewed in detail today is optimal I recommended no other changes  I will see her back in 6 months, she will call sooner with any concerning symptoms in the meantime  Certainly should any thing more than low risk elective surgery be planned I would ask that you give me minesh out as we should entertain stress testing at that point

## 2020-08-10 ENCOUNTER — APPOINTMENT (OUTPATIENT)
Dept: LAB | Facility: CLINIC | Age: 80
End: 2020-08-10
Payer: COMMERCIAL

## 2020-08-10 ENCOUNTER — TRANSCRIBE ORDERS (OUTPATIENT)
Dept: URGENT CARE | Facility: CLINIC | Age: 80
End: 2020-08-10

## 2020-08-10 DIAGNOSIS — E78.2 MIXED HYPERLIPIDEMIA: ICD-10-CM

## 2020-08-10 DIAGNOSIS — I10 ESSENTIAL HYPERTENSION: ICD-10-CM

## 2020-08-10 DIAGNOSIS — E11.9 TYPE 2 DIABETES MELLITUS WITHOUT COMPLICATION, WITHOUT LONG-TERM CURRENT USE OF INSULIN (HCC): Primary | ICD-10-CM

## 2020-08-10 DIAGNOSIS — E11.9 TYPE 2 DIABETES MELLITUS WITHOUT COMPLICATION, WITHOUT LONG-TERM CURRENT USE OF INSULIN (HCC): ICD-10-CM

## 2020-08-10 LAB
ALBUMIN SERPL BCP-MCNC: 3.6 G/DL (ref 3.5–5)
ALP SERPL-CCNC: 71 U/L (ref 46–116)
ALT SERPL W P-5'-P-CCNC: 18 U/L (ref 12–78)
ANION GAP SERPL CALCULATED.3IONS-SCNC: 3 MMOL/L (ref 4–13)
AST SERPL W P-5'-P-CCNC: 13 U/L (ref 5–45)
BACTERIA UR QL AUTO: ABNORMAL /HPF
BASOPHILS # BLD AUTO: 0.06 THOUSANDS/ΜL (ref 0–0.1)
BASOPHILS NFR BLD AUTO: 1 % (ref 0–1)
BILIRUB SERPL-MCNC: 0.27 MG/DL (ref 0.2–1)
BILIRUB UR QL STRIP: NEGATIVE
BUN SERPL-MCNC: 38 MG/DL (ref 5–25)
CALCIUM SERPL-MCNC: 8.4 MG/DL (ref 8.3–10.1)
CHLORIDE SERPL-SCNC: 113 MMOL/L (ref 100–108)
CHOLEST SERPL-MCNC: 158 MG/DL (ref 50–200)
CLARITY UR: CLEAR
CO2 SERPL-SCNC: 26 MMOL/L (ref 21–32)
COLOR UR: YELLOW
CREAT SERPL-MCNC: 1.56 MG/DL (ref 0.6–1.3)
CREAT UR-MCNC: 87.2 MG/DL
EOSINOPHIL # BLD AUTO: 0.16 THOUSAND/ΜL (ref 0–0.61)
EOSINOPHIL NFR BLD AUTO: 2 % (ref 0–6)
ERYTHROCYTE [DISTWIDTH] IN BLOOD BY AUTOMATED COUNT: 13.4 % (ref 11.6–15.1)
EST. AVERAGE GLUCOSE BLD GHB EST-MCNC: 166 MG/DL
GFR SERPL CREATININE-BSD FRML MDRD: 32 ML/MIN/1.73SQ M
GLUCOSE SERPL-MCNC: 249 MG/DL (ref 65–140)
GLUCOSE UR STRIP-MCNC: ABNORMAL MG/DL
HBA1C MFR BLD: 7.4 %
HCT VFR BLD AUTO: 37.2 % (ref 34.8–46.1)
HDLC SERPL-MCNC: 38 MG/DL
HGB BLD-MCNC: 11.7 G/DL (ref 11.5–15.4)
HGB UR QL STRIP.AUTO: NEGATIVE
HYALINE CASTS #/AREA URNS LPF: ABNORMAL /LPF
IMM GRANULOCYTES # BLD AUTO: 0.04 THOUSAND/UL (ref 0–0.2)
IMM GRANULOCYTES NFR BLD AUTO: 1 % (ref 0–2)
KETONES UR STRIP-MCNC: NEGATIVE MG/DL
LDLC SERPL CALC-MCNC: 50 MG/DL (ref 0–100)
LEUKOCYTE ESTERASE UR QL STRIP: ABNORMAL
LYMPHOCYTES # BLD AUTO: 2.43 THOUSANDS/ΜL (ref 0.6–4.47)
LYMPHOCYTES NFR BLD AUTO: 30 % (ref 14–44)
MCH RBC QN AUTO: 31.5 PG (ref 26.8–34.3)
MCHC RBC AUTO-ENTMCNC: 31.5 G/DL (ref 31.4–37.4)
MCV RBC AUTO: 100 FL (ref 82–98)
MICROALBUMIN UR-MCNC: 10.2 MG/L (ref 0–20)
MICROALBUMIN/CREAT 24H UR: 12 MG/G CREATININE (ref 0–30)
MONOCYTES # BLD AUTO: 0.64 THOUSAND/ΜL (ref 0.17–1.22)
MONOCYTES NFR BLD AUTO: 8 % (ref 4–12)
NEUTROPHILS # BLD AUTO: 4.76 THOUSANDS/ΜL (ref 1.85–7.62)
NEUTS SEG NFR BLD AUTO: 58 % (ref 43–75)
NITRITE UR QL STRIP: NEGATIVE
NON-SQ EPI CELLS URNS QL MICRO: ABNORMAL /HPF
NONHDLC SERPL-MCNC: 120 MG/DL
NRBC BLD AUTO-RTO: 0 /100 WBCS
PH UR STRIP.AUTO: 6 [PH]
PLATELET # BLD AUTO: 315 THOUSANDS/UL (ref 149–390)
PMV BLD AUTO: 11 FL (ref 8.9–12.7)
POTASSIUM SERPL-SCNC: 4.5 MMOL/L (ref 3.5–5.3)
PROT SERPL-MCNC: 7.2 G/DL (ref 6.4–8.2)
PROT UR STRIP-MCNC: NEGATIVE MG/DL
RBC # BLD AUTO: 3.71 MILLION/UL (ref 3.81–5.12)
RBC #/AREA URNS AUTO: ABNORMAL /HPF
SODIUM SERPL-SCNC: 142 MMOL/L (ref 136–145)
SP GR UR STRIP.AUTO: 1.02 (ref 1–1.03)
T4 FREE SERPL-MCNC: 1.09 NG/DL (ref 0.76–1.46)
TRIGL SERPL-MCNC: 349 MG/DL
TSH SERPL DL<=0.05 MIU/L-ACNC: 0.83 UIU/ML (ref 0.36–3.74)
UROBILINOGEN UR QL STRIP.AUTO: 0.2 E.U./DL
WBC # BLD AUTO: 8.09 THOUSAND/UL (ref 4.31–10.16)
WBC #/AREA URNS AUTO: ABNORMAL /HPF

## 2020-08-10 PROCEDURE — 36415 COLL VENOUS BLD VENIPUNCTURE: CPT | Performed by: INTERNAL MEDICINE

## 2020-08-10 PROCEDURE — 85025 COMPLETE CBC W/AUTO DIFF WBC: CPT | Performed by: INTERNAL MEDICINE

## 2020-08-10 PROCEDURE — 81001 URINALYSIS AUTO W/SCOPE: CPT | Performed by: INTERNAL MEDICINE

## 2020-08-10 PROCEDURE — 84443 ASSAY THYROID STIM HORMONE: CPT

## 2020-08-10 PROCEDURE — 80061 LIPID PANEL: CPT

## 2020-08-10 PROCEDURE — 82570 ASSAY OF URINE CREATININE: CPT | Performed by: INTERNAL MEDICINE

## 2020-08-10 PROCEDURE — 80053 COMPREHEN METABOLIC PANEL: CPT

## 2020-08-10 PROCEDURE — 84439 ASSAY OF FREE THYROXINE: CPT

## 2020-08-10 PROCEDURE — 83036 HEMOGLOBIN GLYCOSYLATED A1C: CPT

## 2020-08-10 PROCEDURE — 82043 UR ALBUMIN QUANTITATIVE: CPT | Performed by: INTERNAL MEDICINE

## 2021-02-12 DIAGNOSIS — Z23 ENCOUNTER FOR IMMUNIZATION: ICD-10-CM

## 2021-04-30 ENCOUNTER — TRANSCRIBE ORDERS (OUTPATIENT)
Dept: URGENT CARE | Facility: CLINIC | Age: 81
End: 2021-04-30

## 2021-04-30 ENCOUNTER — APPOINTMENT (OUTPATIENT)
Dept: LAB | Facility: CLINIC | Age: 81
End: 2021-04-30
Payer: COMMERCIAL

## 2021-04-30 DIAGNOSIS — E55.9 AVITAMINOSIS D: ICD-10-CM

## 2021-04-30 DIAGNOSIS — E11.9 TYPE 2 DIABETES MELLITUS WITHOUT COMPLICATION, UNSPECIFIED WHETHER LONG TERM INSULIN USE (HCC): ICD-10-CM

## 2021-04-30 DIAGNOSIS — I10 HYPERTENSION, UNSPECIFIED TYPE: Primary | ICD-10-CM

## 2021-04-30 DIAGNOSIS — E78.5 HYPERLIPIDEMIA, UNSPECIFIED HYPERLIPIDEMIA TYPE: ICD-10-CM

## 2021-04-30 DIAGNOSIS — I10 HYPERTENSION, UNSPECIFIED TYPE: ICD-10-CM

## 2021-04-30 LAB
25(OH)D3 SERPL-MCNC: 16.1 NG/ML (ref 30–100)
ALBUMIN SERPL BCP-MCNC: 3.7 G/DL (ref 3.5–5)
ALP SERPL-CCNC: 81 U/L (ref 46–116)
ALT SERPL W P-5'-P-CCNC: 15 U/L (ref 12–78)
ANION GAP SERPL CALCULATED.3IONS-SCNC: 6 MMOL/L (ref 4–13)
AST SERPL W P-5'-P-CCNC: 9 U/L (ref 5–45)
BASOPHILS # BLD AUTO: 0.07 THOUSANDS/ΜL (ref 0–0.1)
BASOPHILS NFR BLD AUTO: 1 % (ref 0–1)
BILIRUB SERPL-MCNC: 0.4 MG/DL (ref 0.2–1)
BUN SERPL-MCNC: 40 MG/DL (ref 5–25)
CALCIUM SERPL-MCNC: 9.1 MG/DL (ref 8.3–10.1)
CHLORIDE SERPL-SCNC: 104 MMOL/L (ref 100–108)
CHOLEST SERPL-MCNC: 278 MG/DL (ref 50–200)
CO2 SERPL-SCNC: 27 MMOL/L (ref 21–32)
CREAT SERPL-MCNC: 1.64 MG/DL (ref 0.6–1.3)
EOSINOPHIL # BLD AUTO: 0.15 THOUSAND/ΜL (ref 0–0.61)
EOSINOPHIL NFR BLD AUTO: 2 % (ref 0–6)
ERYTHROCYTE [DISTWIDTH] IN BLOOD BY AUTOMATED COUNT: 13.7 % (ref 11.6–15.1)
EST. AVERAGE GLUCOSE BLD GHB EST-MCNC: 183 MG/DL
GFR SERPL CREATININE-BSD FRML MDRD: 30 ML/MIN/1.73SQ M
GLUCOSE SERPL-MCNC: 291 MG/DL (ref 65–140)
HBA1C MFR BLD: 8 %
HCT VFR BLD AUTO: 37.9 % (ref 34.8–46.1)
HDLC SERPL-MCNC: 41 MG/DL
HGB BLD-MCNC: 11.9 G/DL (ref 11.5–15.4)
IMM GRANULOCYTES # BLD AUTO: 0.01 THOUSAND/UL (ref 0–0.2)
IMM GRANULOCYTES NFR BLD AUTO: 0 % (ref 0–2)
LDLC SERPL CALC-MCNC: 178 MG/DL (ref 0–100)
LYMPHOCYTES # BLD AUTO: 3.7 THOUSANDS/ΜL (ref 0.6–4.47)
LYMPHOCYTES NFR BLD AUTO: 49 % (ref 14–44)
MCH RBC QN AUTO: 30.5 PG (ref 26.8–34.3)
MCHC RBC AUTO-ENTMCNC: 31.4 G/DL (ref 31.4–37.4)
MCV RBC AUTO: 97 FL (ref 82–98)
MONOCYTES # BLD AUTO: 0.69 THOUSAND/ΜL (ref 0.17–1.22)
MONOCYTES NFR BLD AUTO: 9 % (ref 4–12)
NEUTROPHILS # BLD AUTO: 2.91 THOUSANDS/ΜL (ref 1.85–7.62)
NEUTS SEG NFR BLD AUTO: 39 % (ref 43–75)
NONHDLC SERPL-MCNC: 237 MG/DL
NRBC BLD AUTO-RTO: 0 /100 WBCS
PLATELET # BLD AUTO: 335 THOUSANDS/UL (ref 149–390)
PMV BLD AUTO: 10.9 FL (ref 8.9–12.7)
POTASSIUM SERPL-SCNC: 3.8 MMOL/L (ref 3.5–5.3)
PROT SERPL-MCNC: 7.4 G/DL (ref 6.4–8.2)
RBC # BLD AUTO: 3.9 MILLION/UL (ref 3.81–5.12)
SODIUM SERPL-SCNC: 137 MMOL/L (ref 136–145)
TRIGL SERPL-MCNC: 293 MG/DL
WBC # BLD AUTO: 7.53 THOUSAND/UL (ref 4.31–10.16)

## 2021-04-30 PROCEDURE — 83036 HEMOGLOBIN GLYCOSYLATED A1C: CPT

## 2021-04-30 PROCEDURE — 80061 LIPID PANEL: CPT

## 2021-04-30 PROCEDURE — 80053 COMPREHEN METABOLIC PANEL: CPT

## 2021-04-30 PROCEDURE — 36415 COLL VENOUS BLD VENIPUNCTURE: CPT | Performed by: INTERNAL MEDICINE

## 2021-04-30 PROCEDURE — 85025 COMPLETE CBC W/AUTO DIFF WBC: CPT | Performed by: INTERNAL MEDICINE

## 2021-04-30 PROCEDURE — 82306 VITAMIN D 25 HYDROXY: CPT

## 2021-06-18 ENCOUNTER — OFFICE VISIT (OUTPATIENT)
Dept: CARDIOLOGY CLINIC | Facility: CLINIC | Age: 81
End: 2021-06-18
Payer: COMMERCIAL

## 2021-06-18 VITALS
BODY MASS INDEX: 41.98 KG/M2 | HEART RATE: 71 BPM | SYSTOLIC BLOOD PRESSURE: 140 MMHG | DIASTOLIC BLOOD PRESSURE: 70 MMHG | HEIGHT: 58 IN | WEIGHT: 200 LBS

## 2021-06-18 DIAGNOSIS — I35.0 AORTIC VALVE STENOSIS, ETIOLOGY OF CARDIAC VALVE DISEASE UNSPECIFIED: ICD-10-CM

## 2021-06-18 DIAGNOSIS — E78.2 MIXED HYPERLIPIDEMIA: ICD-10-CM

## 2021-06-18 DIAGNOSIS — I25.10 CORONARY ARTERY DISEASE INVOLVING NATIVE CORONARY ARTERY OF NATIVE HEART WITHOUT ANGINA PECTORIS: Primary | ICD-10-CM

## 2021-06-18 DIAGNOSIS — Z95.5 H/O HEART ARTERY STENT: ICD-10-CM

## 2021-06-18 PROCEDURE — 99214 OFFICE O/P EST MOD 30 MIN: CPT | Performed by: INTERNAL MEDICINE

## 2021-06-18 PROCEDURE — 93000 ELECTROCARDIOGRAM COMPLETE: CPT | Performed by: INTERNAL MEDICINE

## 2021-06-18 RX ORDER — ATORVASTATIN CALCIUM 80 MG/1
80 TABLET, FILM COATED ORAL DAILY
Qty: 90 TABLET | Refills: 3 | Status: SHIPPED | OUTPATIENT
Start: 2021-06-18

## 2021-06-18 RX ORDER — ICOSAPENT ETHYL 1000 MG/1
2 CAPSULE ORAL 2 TIMES DAILY
Qty: 180 CAPSULE | Refills: 3 | Status: SHIPPED | OUTPATIENT
Start: 2021-06-18 | End: 2022-01-05

## 2021-06-18 NOTE — PATIENT INSTRUCTIONS
Cholesterol and Your Health   AMBULATORY CARE:   Cholesterol  is a waxy, fat-like substance  Your body uses cholesterol to make hormones and new cells, and to protect nerves  Cholesterol is made by your body  It also comes from certain foods you eat, such as meat and dairy products  Your healthcare provider can help you set goals for your cholesterol levels  He or she can help you create a plan to meet your goals  Cholesterol level goals: Your cholesterol level goals depend on your risk for heart disease, your age, and your other health conditions  The following are general guidelines:  · Total cholesterol  includes low-density lipoprotein (LDL), high-density lipoprotein (HDL), and triglyceride levels  The total cholesterol level should be lower than 200 mg/dL and is best at about 150 mg/dL  · LDL cholesterol  is called bad cholesterol  because it forms plaque in your arteries  As plaque builds up, your arteries become narrow, and less blood flows through  When plaque decreases blood flow to your heart, you may have chest pain  If plaque completely blocks an artery that brings blood to your heart, you may have a heart attack  Plaque can break off and form blood clots  Blood clots may block arteries in your brain and cause a stroke  The level should be less than 130 mg/dL and is best at about 100 mg/dL  · HDL cholesterol  is called good cholesterol  because it helps remove LDL cholesterol from your arteries  It does this by attaching to LDL cholesterol and carrying it to your liver  Your liver breaks down LDL cholesterol so your body can get rid of it  High levels of HDL cholesterol can help prevent a heart attack and stroke  Low levels of HDL cholesterol can increase your risk for heart disease, heart attack, and stroke  The level should be 60 mg/dL or higher  · Triglycerides  are a type of fat that store energy from foods you eat  High levels of triglycerides also cause plaque buildup   This can increase your risk for a heart attack or stroke  If your triglyceride level is high, your LDL cholesterol level may also be high  The level should be less than 150 mg/dL  What increases your risk for high cholesterol:   · Smoking cigarettes    · Being overweight or obese, or not getting enough exercise    · Drinking large amounts of alcohol    · A medical condition such as hypertension (high blood pressure) or diabetes    · Certain genes passed from your parents to you    · Age older than 65 years    What you need to know about having your cholesterol levels checked: Adults 21to 39years of age should have their cholesterol levels checked every 4 to 6 years  Adults 45 years or older should have their cholesterol checked every 1 to 2 years  You may need your cholesterol checked more often, or at a younger age, if you have risk factors for heart disease  You may also need to have your cholesterol checked more often if you have other health conditions, such as diabetes  Blood tests are used to check cholesterol levels  Blood tests measure your levels of triglycerides, LDL cholesterol, and HDL cholesterol  How healthy fats affect your cholesterol levels:  Healthy fats, also called unsaturated fats, help lower LDL cholesterol and triglyceride levels  Healthy fats include the following:  · Monounsaturated fats  are found in foods such as olive oil, canola oil, avocado, nuts, and olives  · Polyunsaturated fats,  such as omega 3 fats, are found in fish, such as salmon, trout, and tuna  They can also be found in plant foods such as flaxseed, walnuts, and soybeans  How unhealthy fats affect your cholesterol levels:  Unhealthy fats increase LDL cholesterol and triglyceride levels  They are found in foods high in cholesterol, saturated fat, and trans fat:  · Cholesterol  is found in eggs, dairy, and meat  · Saturated fat  is found in butter, cheese, ice cream, whole milk, and coconut oil   Saturated fat is also found in meat, such as sausage, hot dogs, and bologna  · Trans fat  is found in liquid oils and is used in fried and baked foods  Foods that contain trans fats include chips, crackers, muffins, sweet rolls, microwave popcorn, and cookies  Treatment  for high cholesterol will also decrease your risk of heart disease, heart attack, and stroke  Treatment may include any of the following:  · Lifestyle changes  may include food, exercise, weight loss, and quitting smoking  You may also need to decrease the amount of alcohol you drink  Your healthcare provider will want you to start with lifestyle changes  Other treatment may be added if lifestyle changes are not enough  · Medicines  may be given to lower your LDL cholesterol, triglyceride levels, or total cholesterol level  You may need medicines to lower your cholesterol if any of the following is true:     ? You have a history of stroke, TIA, unstable angina, or a heart attack  ? Your LDL cholesterol level is 190 mg/dL or higher  ? You are age 36 to 76 years, have diabetes or heart disease risk factors, and your LDL cholesterol is 70 mg/dL or higher  · Supplements  include fish oil, red yeast rice, and garlic  Fish oil may help lower your triglyceride and LDL cholesterol levels  It may also increase your HDL cholesterol level  Red yeast rice may help decrease your total cholesterol level and LDL cholesterol level  Garlic may help lower your total cholesterol level  Do not take these supplements without talking to your healthcare provider  Food changes you can make to lower your cholesterol levels:  A dietitian can help you create a healthy eating plan  He or she can show you how to read food labels and choose foods low in saturated fat, trans fats, and cholesterol  · Decrease the total amount of fat you eat  Choose lean meats, fat-free or 1% fat milk, and low-fat dairy products, such as yogurt and cheese  Try to limit or avoid red meats  Limit or do not eat fried foods or baked goods, such as cookies  · Replace unhealthy fats with healthy fats  Cook foods in olive oil or canola oil  Choose soft margarines that are low in saturated fat and trans fat  Seeds, nuts, and avocados are other examples of healthy fats  · Eat foods with omega-3 fats  Examples include salmon, tuna, mackerel, walnuts, and flaxseed  Eat fish 2 times per week  Pregnant women should not eat fish that have high levels of mercury, such as shark, swordfish, and alisson mackerel  · Increase the amount of high-fiber foods you eat  High-fiber foods can help lower your LDL cholesterol  Aim to get between 20 and 30 grams of fiber each day  Fruits and vegetables are high in fiber  Eat at least 5 servings each day  Other high-fiber foods are whole-grain or whole-wheat breads, pastas, or cereals, and brown rice  Eat 3 ounces of whole-grain foods each day  Increase fiber slowly  You may have abdominal discomfort, bloating, and gas if you add fiber to your diet too quickly  · Eat healthy protein foods  Examples include low-fat dairy products, skinless chicken and turkey, fish, and nuts  · Limit foods and drinks that are high in sugar  Your dietitian or healthcare provider can help you create daily limits for high-sugar foods and drinks  The limit may be lower if you have diabetes or another health condition  Limits can also help you lose weight if needed  Lifestyle changes you can make to lower your cholesterol levels:   · Maintain a healthy weight  Ask your healthcare provider what a healthy weight is for you  Ask him or her to help you create a weight loss plan if needed  Weight loss can decrease your total cholesterol and triglyceride levels  Weight loss may also help keep your blood pressure at a healthy level  · Exercise regularly  Exercise can help lower your total cholesterol level and maintain a healthy weight   Exercise can also help increase your HDL cholesterol level  Work with your healthcare provider to create an exercise program that is right for you  Get at least 30 to 40 minutes of moderate exercise most days of the week  Examples of exercise include brisk walking, swimming, or biking  · Do not smoke  Nicotine and other chemicals in cigarettes and cigars can raise your cholesterol levels  Ask your healthcare provider for information if you currently smoke and need help to quit  E-cigarettes or smokeless tobacco still contain nicotine  Talk to your healthcare provider before you use these products  · Limit or do not drink alcohol  Alcohol can increase your triglyceride levels  Ask your healthcare provider before you drink alcohol  Ask how much is okay for you to drink in 1 day or 1 week  © Copyright [address] [place] [address] Drive Information is for End User's use only and may not be sold, redistributed or otherwise used for commercial purposes  All illustrations and images included in CareNotes® are the copyrighted property of [employer]  or [facility] [place] [facility]   The above information is an  only  It is not intended as medical advice for individual conditions or treatments  Talk to your doctor, nurse or pharmacist before following any medical regimen to see if it is safe and effective for you

## 2021-08-16 ENCOUNTER — APPOINTMENT (OUTPATIENT)
Dept: LAB | Facility: CLINIC | Age: 81
End: 2021-08-16
Payer: COMMERCIAL

## 2021-08-16 DIAGNOSIS — I25.10 CORONARY ARTERY DISEASE INVOLVING NATIVE CORONARY ARTERY OF NATIVE HEART WITHOUT ANGINA PECTORIS: ICD-10-CM

## 2021-08-16 DIAGNOSIS — E78.2 MIXED HYPERLIPIDEMIA: ICD-10-CM

## 2021-08-16 LAB
ALT SERPL W P-5'-P-CCNC: 18 U/L (ref 12–78)
AST SERPL W P-5'-P-CCNC: 14 U/L (ref 5–45)
LDLC SERPL DIRECT ASSAY-MCNC: 100 MG/DL (ref 0–100)
TRIGL SERPL-MCNC: 209 MG/DL

## 2021-08-16 PROCEDURE — 84450 TRANSFERASE (AST) (SGOT): CPT

## 2021-08-16 PROCEDURE — 83721 ASSAY OF BLOOD LIPOPROTEIN: CPT

## 2021-08-16 PROCEDURE — 84478 ASSAY OF TRIGLYCERIDES: CPT

## 2021-08-16 PROCEDURE — 36415 COLL VENOUS BLD VENIPUNCTURE: CPT

## 2021-08-16 PROCEDURE — 84460 ALANINE AMINO (ALT) (SGPT): CPT

## 2021-12-20 ENCOUNTER — HOSPITAL ENCOUNTER (OUTPATIENT)
Dept: NON INVASIVE DIAGNOSTICS | Facility: CLINIC | Age: 81
Discharge: HOME | End: 2021-12-20
Payer: COMMERCIAL

## 2021-12-20 VITALS
HEIGHT: 58 IN | HEART RATE: 68 BPM | SYSTOLIC BLOOD PRESSURE: 130 MMHG | DIASTOLIC BLOOD PRESSURE: 70 MMHG | WEIGHT: 200 LBS | BODY MASS INDEX: 41.98 KG/M2

## 2021-12-20 DIAGNOSIS — Z95.5 H/O HEART ARTERY STENT: ICD-10-CM

## 2021-12-20 DIAGNOSIS — E78.2 MIXED HYPERLIPIDEMIA: ICD-10-CM

## 2021-12-20 DIAGNOSIS — I35.0 AORTIC VALVE STENOSIS, ETIOLOGY OF CARDIAC VALVE DISEASE UNSPECIFIED: ICD-10-CM

## 2021-12-20 DIAGNOSIS — I25.10 CORONARY ARTERY DISEASE INVOLVING NATIVE CORONARY ARTERY OF NATIVE HEART WITHOUT ANGINA PECTORIS: ICD-10-CM

## 2021-12-20 LAB
AORTIC ROOT: 3.7 CM
AORTIC VALVE MEAN VELOCITY: 14 M/S
APICAL FOUR CHAMBER EJECTION FRACTION: 61 %
AV LVOT MEAN GRADIENT: 4 MMHG
AV LVOT PEAK GRADIENT: 5 MMHG
AV MEAN GRADIENT: 9 MMHG
AV PEAK GRADIENT: 15 MMHG
DOP CALC AO VTI: 47.38 CM
DOP CALC LVOT PEAK VEL VTI: 29.37 CM
DOP CALC LVOT PEAK VEL: 1.16 M/S
DOP CALC RVOT PEAK VEL: 0.7 M/S
DOP CALC RVOT VTI: 15.94 CM
E WAVE DECELERATION TIME: 327 MS
FRACTIONAL SHORTENING: 66 % (ref 28–44)
INTERVENTRICULAR SEPTUM IN DIASTOLE (PARASTERNAL SHORT AXIS VIEW): 1.1 CM
LEFT ATRIUM AREA SYSTOLE SINGLE PLANE A4C: 15.1 CM2
LEFT INTERNAL DIMENSION IN SYSTOLE: 1.4 CM (ref 2.1–4)
LEFT VENTRICULAR INTERNAL DIMENSION IN DIASTOLE: 4.1 CM (ref 4.69–6.98)
LEFT VENTRICULAR POSTERIOR WALL IN END DIASTOLE: 1.1 CM
LEFT VENTRICULAR STROKE VOLUME: 69 ML
MV E'TISSUE VEL-SEP: 7 CM/S
MV PEAK A VEL: 1.11 M/S
MV PEAK E VEL: 70 CM/S
MV STENOSIS PRESSURE HALF TIME: 0 MS
PV MEAN GRADIENT: 1 MMHG
PV MEAN VELOCITY: 56 CM/S
PV PEAK GRADIENT: 3 MMHG
PV VTI: 19.01 CM
RIGHT ATRIUM AREA SYSTOLE A4C: 12.3 CM2
RIGHT VENTRICLE ID DIMENSION: 2.9 CM
SL CV LV EF: 60
SL CV PED ECHO LEFT VENTRICLE DIASTOLIC VOLUME (MOD BIPLANE) 2D: 74 ML
SL CV PED ECHO LEFT VENTRICLE SYSTOLIC VOLUME (MOD BIPLANE) 2D: 5 ML
SL CV RVOT MAX GRADIENT: 2 MMHG
SL CV RVOT MEAN GRADIENT: 1 MMHG
SL CV RVOT VMEAN: 0.5 M/S
TRICUSPID VALVE PEAK REGURGITATION VELOCITY: 2.15 M/S
TRICUSPID VALVE S': 1.2 CM/S
TV PEAK GRADIENT: 18 MMHG
Z-SCORE OF LEFT VENTRICULAR DIMENSION IN END SYSTOLE: -3.32

## 2021-12-20 PROCEDURE — 93306 TTE W/DOPPLER COMPLETE: CPT | Performed by: INTERNAL MEDICINE

## 2021-12-20 PROCEDURE — 93306 TTE W/DOPPLER COMPLETE: CPT

## 2022-01-05 ENCOUNTER — OFFICE VISIT (OUTPATIENT)
Dept: CARDIOLOGY CLINIC | Facility: CLINIC | Age: 82
End: 2022-01-05
Payer: COMMERCIAL

## 2022-01-05 VITALS
WEIGHT: 195 LBS | DIASTOLIC BLOOD PRESSURE: 70 MMHG | HEART RATE: 80 BPM | SYSTOLIC BLOOD PRESSURE: 118 MMHG | BODY MASS INDEX: 40.93 KG/M2 | HEIGHT: 58 IN

## 2022-01-05 DIAGNOSIS — E78.2 MIXED HYPERLIPIDEMIA: Primary | ICD-10-CM

## 2022-01-05 DIAGNOSIS — Z95.5 H/O HEART ARTERY STENT: ICD-10-CM

## 2022-01-05 DIAGNOSIS — I25.10 ATHEROSCLEROSIS OF NATIVE CORONARY ARTERY OF NATIVE HEART WITHOUT ANGINA PECTORIS: ICD-10-CM

## 2022-01-05 DIAGNOSIS — I35.0 AORTIC VALVE STENOSIS, ETIOLOGY OF CARDIAC VALVE DISEASE UNSPECIFIED: ICD-10-CM

## 2022-01-05 DIAGNOSIS — I10 ESSENTIAL (PRIMARY) HYPERTENSION: ICD-10-CM

## 2022-01-05 PROCEDURE — 99214 OFFICE O/P EST MOD 30 MIN: CPT | Performed by: INTERNAL MEDICINE

## 2022-01-05 RX ORDER — EZETIMIBE 10 MG/1
10 TABLET ORAL DAILY
Qty: 90 TABLET | Refills: 3 | Status: SHIPPED | OUTPATIENT
Start: 2022-01-05

## 2022-01-05 NOTE — PROGRESS NOTES
Subjective:        Patient ID: Mason Caass is a 80 y o  female  Chief Complaint:  Christi is here for cardiac follow-up  Fortunately she offers no specific complaints, see ROS  We reviewed her echocardiogram done recent together, normal LVEF with no more than trivial aortic stenosis, stable from 2019  The following portions of the patient's history were reviewed and updated as appropriate: allergies, current medications, past family history, past medical history, past social history, past surgical history and problem list   Review of Systems   Constitutional: Negative for chills, diaphoresis, malaise/fatigue and weight gain  HENT: Negative for nosebleeds and stridor  Eyes: Negative for double vision, vision loss in left eye, vision loss in right eye and visual disturbance  Cardiovascular: Negative for chest pain, claudication, cyanosis, dyspnea on exertion, irregular heartbeat, leg swelling, near-syncope, orthopnea, palpitations, paroxysmal nocturnal dyspnea and syncope  Respiratory: Negative for cough, shortness of breath, snoring and wheezing  Endocrine: Negative for polydipsia, polyphagia and polyuria  Hematologic/Lymphatic: Negative for bleeding problem  Does not bruise/bleed easily  Skin: Negative for flushing and rash  Musculoskeletal: Negative for falls and myalgias  Gastrointestinal: Negative for abdominal pain, heartburn, hematemesis, hematochezia, melena and nausea  Genitourinary: Negative for hematuria  Neurological: Negative for brief paralysis, dizziness, focal weakness, headaches, light-headedness, loss of balance and vertigo  Psychiatric/Behavioral: Negative for altered mental status and substance abuse  Allergic/Immunologic: Negative for hives  Objective:      /70   Pulse 80   Ht 4' 10" (1 473 m)   Wt 88 5 kg (195 lb)   BMI 40 76 kg/m²   Physical Exam  Vitals and nursing note reviewed     Constitutional:       Appearance: She is well-developed  HENT:      Head: Normocephalic and atraumatic  Eyes:      Pupils: Pupils are equal, round, and reactive to light  Neck:      Thyroid: No thyromegaly  Vascular: No JVD  Cardiovascular:      Rate and Rhythm: Normal rate and regular rhythm  Pulses: Intact distal pulses  Heart sounds: Murmur (Grade 1/6 AS quality murmur right upper sternal border preserved S2) heard  No friction rub  No gallop  Pulmonary:      Effort: Pulmonary effort is normal  No respiratory distress  Breath sounds: Normal breath sounds  No stridor  No wheezing or rales  Abdominal:      General: Bowel sounds are normal       Palpations: Abdomen is soft  There is no mass  Tenderness: There is no abdominal tenderness  Musculoskeletal:         General: Normal range of motion  Cervical back: Normal range of motion and neck supple  Lymphadenopathy:      Cervical: No cervical adenopathy  Skin:     General: Skin is warm and dry  Coloration: Skin is not pale  Findings: No rash  Neurological:      Mental Status: She is alert and oriented to person, place, and time  Psychiatric:         Behavior: Behavior normal          Thought Content:  Thought content normal          Judgment: Judgment normal          Lab Review:   Hospital Outpatient Visit on 12/20/2021   Component Date Value    A4C EF 12/20/2021 61     LVIDd 12/20/2021 4 10     LVIDS 12/20/2021 1 40     IVSd 12/20/2021 1 10     LVPWd 12/20/2021 1 10     FS 12/20/2021 66     MV E' Tissue Velocity Se* 12/20/2021 7     E wave deceleration time 12/20/2021 327     MV Peak E Dimitris 12/20/2021 70     MV Peak A Dimitris 12/20/2021 1 11     AV LVOT peak gradient an* 12/20/2021 5     LVOT peak VTI 12/20/2021 29 37     LVOT peak dimitris 12/20/2021 1 16     RVOT peak VTI 12/20/2021 15 94     RVID d 12/20/2021 2 9     TV S' 12/20/2021 1 2     RVOT VMEAN 12/20/2021 0 5     RVOT PMEAN 12/20/2021 1     RVOT PMAX 12/20/2021 2     GRAZYNA A4C 12/20/2021 15 1     RAA A4C 12/20/2021 12 3     Ao VTI antegrade 12/20/2021 47 38     AV mean gradient antegra* 12/20/2021 9     LVOT mn grad 12/20/2021 4 0     AV peak gradient antegra* 12/20/2021 15     MV stenosis pressure 1/2* 12/20/2021 0     TV peak gradient 12/20/2021 18     PV mean gradient antegra* 12/20/2021 1     PV peak gradient antegra* 12/20/2021 3     RVOT peak dimitris 12/20/2021 0 7     Pulmonic Valve Systolic * 30/79/1735 78 10     Ao root 12/20/2021 3 70     PV Mean Dimitris 12/20/2021 56     Aortic valve mean veloci* 12/20/2021 14 00     Tricuspid valve peak reg* 12/20/2021 2 15     Left ventricular stroke * 12/20/2021 69 00     LEFT VENTRICLE SYSTOLIC * 83/76/6239 5     LV DIASTOLIC VOLUME (MOD* 74/94/3102 74     LV EF 12/20/2021 60     ZLVIDS 12/20/2021 -3 32      Echo complete    Result Date: 12/20/2021  Narrative:   Left Ventricle: Left ventricular cavity size is normal  The left ventricular ejection fraction is 60%  Systolic function is normal  Wall motion is normal    Aortic Valve: The aortic valve is trileaflet  The leaflets are mild to moderately calcified  The leaflets exhibit minimal decrease in mobility  There is no evidence of stenosis  Assessment:       1  Mixed hyperlipidemia  ezetimibe (ZETIA) 10 mg tablet   2  Atherosclerosis of native coronary artery of native heart without angina pectoris     3  Essential (primary) hypertension     4  Aortic valve stenosis, etiology of cardiac valve disease unspecified     5  H/O heart artery stent          Plan:       Kym Kinsey was happy to hear that her aortic stenosis is no more than trivial, no need to follow-up on this with echocardiography for another 2 years  SBE antibiotic prophylaxis not required  She has no symptoms reminiscent of angina, heart failure nor electrical instability      Blood pressure well controlled, lipids suboptimal, fish oil intolerant, Vascepa too expensive, will add Zetia to maximal dose statin  Full blood work pending with you in March adequate  I will see her back in 6 months otherwise, she will call sooner with any concerning potential cardiac symptoms prior

## 2022-01-05 NOTE — PATIENT INSTRUCTIONS
Cholesterol and Your Health   AMBULATORY CARE:   Cholesterol  is a waxy, fat-like substance  Your body uses cholesterol to make hormones and new cells, and to protect nerves  Cholesterol is made by your body  It also comes from certain foods you eat, such as meat and dairy products  Your healthcare provider can help you set goals for your cholesterol levels  He or she can help you create a plan to meet your goals  Cholesterol level goals: Your cholesterol level goals depend on your risk for heart disease, your age, and your other health conditions  The following are general guidelines:  · Total cholesterol  includes low-density lipoprotein (LDL), high-density lipoprotein (HDL), and triglyceride levels  The total cholesterol level should be lower than 200 mg/dL and is best at about 150 mg/dL  · LDL cholesterol  is called bad cholesterol  because it forms plaque in your arteries  As plaque builds up, your arteries become narrow, and less blood flows through  When plaque decreases blood flow to your heart, you may have chest pain  If plaque completely blocks an artery that brings blood to your heart, you may have a heart attack  Plaque can break off and form blood clots  Blood clots may block arteries in your brain and cause a stroke  The level should be less than 130 mg/dL and is best at about 100 mg/dL  · HDL cholesterol  is called good cholesterol  because it helps remove LDL cholesterol from your arteries  It does this by attaching to LDL cholesterol and carrying it to your liver  Your liver breaks down LDL cholesterol so your body can get rid of it  High levels of HDL cholesterol can help prevent a heart attack and stroke  Low levels of HDL cholesterol can increase your risk for heart disease, heart attack, and stroke  The level should be 60 mg/dL or higher  · Triglycerides  are a type of fat that store energy from foods you eat  High levels of triglycerides also cause plaque buildup   This can increase your risk for a heart attack or stroke  If your triglyceride level is high, your LDL cholesterol level may also be high  The level should be less than 150 mg/dL  Any of the following can increase your risk for high cholesterol:   · Smoking cigarettes    · Being overweight or obese, or not getting enough exercise    · Drinking large amounts of alcohol    · A medical condition such as hypertension (high blood pressure) or diabetes    · Certain genes passed from your parents to you    · Age older than 65 years    What you need to know about having your cholesterol levels checked: Adults 21to 39years of age should have their cholesterol levels checked every 4 to 6 years  Adults 45 years or older should have their cholesterol checked every 1 to 2 years  You may need your cholesterol checked more often, or at a younger age, if you have risk factors for heart disease  You may also need to have your cholesterol checked more often if you have other health conditions, such as diabetes  Blood tests are used to check cholesterol levels  Blood tests measure your levels of triglycerides, LDL cholesterol, and HDL cholesterol  How healthy fats affect your cholesterol levels:  Healthy fats, also called unsaturated fats, help lower LDL cholesterol and triglyceride levels  Healthy fats include the following:  · Monounsaturated fats  are found in foods such as olive oil, canola oil, avocado, nuts, and olives  · Polyunsaturated fats,  such as omega 3 fats, are found in fish, such as salmon, trout, and tuna  They can also be found in plant foods such as flaxseed, walnuts, and soybeans  How unhealthy fats affect your cholesterol levels:  Unhealthy fats increase LDL cholesterol and triglyceride levels  They are found in foods high in cholesterol, saturated fat, and trans fat:  · Cholesterol  is found in eggs, dairy, and meat  · Saturated fat  is found in butter, cheese, ice cream, whole milk, and coconut oil  Saturated fat is also found in meat, such as sausage, hot dogs, and bologna  · Trans fat  is found in liquid oils and is used in fried and baked foods  Foods that contain trans fats include chips, crackers, muffins, sweet rolls, microwave popcorn, and cookies  Treatment  for high cholesterol will also decrease your risk of heart disease, heart attack, and stroke  Treatment may include any of the following:  · Lifestyle changes  may include food, exercise, weight loss, and quitting smoking  You may also need to decrease the amount of alcohol you drink  Your healthcare provider will want you to start with lifestyle changes  Other treatment may be added if lifestyle changes are not enough  Your healthcare provider may recommend you work with a team to manage hyperlipidemia  The team may include medical experts such as a dietitian, an exercise or physical therapist, and a behavior therapist  Your family members may be included in helping you create lifestyle changes  · Medicines  may be given to lower your LDL cholesterol, triglyceride levels, or total cholesterol level  You may need medicines to lower your cholesterol if any of the following is true:    ? You have a history of stroke, TIA, unstable angina, or a heart attack  ? Your LDL cholesterol level is 190 mg/dL or higher  ? You are age 36 to 76 years, have diabetes or heart disease risk factors, and your LDL cholesterol is 70 mg/dL or higher  · Supplements  include fish oil, red yeast rice, and garlic  Fish oil may help lower your triglyceride and LDL cholesterol levels  It may also increase your HDL cholesterol level  Red yeast rice may help decrease your total cholesterol level and LDL cholesterol level  Garlic may help lower your total cholesterol level  Do not take any supplements without talking to your healthcare provider  Food changes you can make to lower your cholesterol levels:  A dietitian can help you create a healthy eating plan  He or she can show you how to read food labels and choose foods low in saturated fat, trans fats, and cholesterol  · Decrease the total amount of fat you eat  Choose lean meats, fat-free or 1% fat milk, and low-fat dairy products, such as yogurt and cheese  Try to limit or avoid red meats  Limit or do not eat fried foods or baked goods, such as cookies  · Replace unhealthy fats with healthy fats  Cook foods in olive oil or canola oil  Choose soft margarines that are low in saturated fat and trans fat  Seeds, nuts, and avocados are other examples of healthy fats  · Eat foods with omega-3 fats  Examples include salmon, tuna, mackerel, walnuts, and flaxseed  Eat fish 2 times per week  Pregnant women should not eat fish that have high levels of mercury, such as shark, swordfish, and alisson mackerel  · Increase the amount of high-fiber foods you eat  High-fiber foods can help lower your LDL cholesterol  Aim to get between 20 and 30 grams of fiber each day  Fruits and vegetables are high in fiber  Eat at least 5 servings each day  Other high-fiber foods are whole-grain or whole-wheat breads, pastas, or cereals, and brown rice  Eat 3 ounces of whole-grain foods each day  Increase fiber slowly  You may have abdominal discomfort, bloating, and gas if you add fiber to your diet too quickly  · Eat healthy protein foods  Examples include low-fat dairy products, skinless chicken and turkey, fish, and nuts  · Limit foods and drinks that are high in sugar  Your dietitian or healthcare provider can help you create daily limits for high-sugar foods and drinks  The limit may be lower if you have diabetes or another health condition  Limits can also help you lose weight if needed  Lifestyle changes you can make to lower your cholesterol levels:   · Maintain a healthy weight  Ask your healthcare provider what a healthy weight is for you   Ask him or her to help you create a weight loss plan if needed  Weight loss can decrease your total cholesterol and triglyceride levels  Weight loss may also help keep your blood pressure at a healthy level  · Be physically active throughout the day  Physical activity, such as exercise, can help lower your total cholesterol level and maintain a healthy weight  Physical activity can also help increase your HDL cholesterol level  Work with your healthcare provider to create an program that is right for you  Get at least 30 to 40 minutes of moderate physical activity most days of the week  Examples of exercise include brisk walking, swimming, or biking  Also include strength training at least 2 times each week  Your healthcare providers can help you create a physical activity plan  · Do not smoke  Nicotine and other chemicals in cigarettes and cigars can raise your cholesterol levels  Ask your healthcare provider for information if you currently smoke and need help to quit  E-cigarettes or smokeless tobacco still contain nicotine  Talk to your healthcare provider before you use these products  · Limit or do not drink alcohol  Alcohol can increase your triglyceride levels  Ask your healthcare provider before you drink alcohol  Ask how much is okay for you to drink in 24 hours or 1 week  Follow up with your doctor as directed:  Write down your questions so you remember to ask them during your visits  © Copyright [employer] 2021 Information is for End User's use only and may not be sold, redistributed or otherwise used for commercial purposes  All illustrations and images included in CareNotes® are the copyrighted property of [employer]  or James Barrientos  The above information is an  only  It is not intended as medical advice for individual conditions or treatments  Talk to your doctor, nurse or pharmacist before following any medical regimen to see if it is safe and effective for you

## 2022-02-02 DIAGNOSIS — I25.10 ATHEROSCLEROSIS OF NATIVE CORONARY ARTERY OF NATIVE HEART WITHOUT ANGINA PECTORIS: Primary | ICD-10-CM

## 2022-02-02 DIAGNOSIS — Z95.5 H/O HEART ARTERY STENT: ICD-10-CM

## 2022-02-02 DIAGNOSIS — Z01.810 PRE-PROCEDURAL CARDIOVASCULAR EXAMINATION: ICD-10-CM

## 2022-02-03 ENCOUNTER — APPOINTMENT (OUTPATIENT)
Dept: LAB | Facility: CLINIC | Age: 82
End: 2022-02-03
Payer: COMMERCIAL

## 2022-02-03 DIAGNOSIS — E11.9 TYPE 2 DIABETES MELLITUS WITHOUT COMPLICATION, UNSPECIFIED WHETHER LONG TERM INSULIN USE (HCC): ICD-10-CM

## 2022-02-03 DIAGNOSIS — E78.5 HYPERLIPIDEMIA, UNSPECIFIED HYPERLIPIDEMIA TYPE: ICD-10-CM

## 2022-02-03 DIAGNOSIS — I10 HYPERTENSION, UNSPECIFIED TYPE: ICD-10-CM

## 2022-02-03 DIAGNOSIS — N18.30 STAGE 3 CHRONIC KIDNEY DISEASE, UNSPECIFIED WHETHER STAGE 3A OR 3B CKD (HCC): ICD-10-CM

## 2022-02-03 LAB
ALBUMIN SERPL BCP-MCNC: 3.9 G/DL (ref 3.5–5)
ALP SERPL-CCNC: 98 U/L (ref 46–116)
ALT SERPL W P-5'-P-CCNC: 19 U/L (ref 12–78)
ANION GAP SERPL CALCULATED.3IONS-SCNC: 3 MMOL/L (ref 4–13)
AST SERPL W P-5'-P-CCNC: 12 U/L (ref 5–45)
BILIRUB SERPL-MCNC: 0.78 MG/DL (ref 0.2–1)
BUN SERPL-MCNC: 36 MG/DL (ref 5–25)
CALCIUM SERPL-MCNC: 9.8 MG/DL (ref 8.3–10.1)
CHLORIDE SERPL-SCNC: 107 MMOL/L (ref 100–108)
CHOLEST SERPL-MCNC: 166 MG/DL
CO2 SERPL-SCNC: 28 MMOL/L (ref 21–32)
CREAT SERPL-MCNC: 1.72 MG/DL (ref 0.6–1.3)
EST. AVERAGE GLUCOSE BLD GHB EST-MCNC: 229 MG/DL
GFR SERPL CREATININE-BSD FRML MDRD: 27 ML/MIN/1.73SQ M
GLUCOSE P FAST SERPL-MCNC: 270 MG/DL (ref 65–99)
HBA1C MFR BLD: 9.6 %
HDLC SERPL-MCNC: 44 MG/DL
LDLC SERPL CALC-MCNC: 92 MG/DL (ref 0–100)
NONHDLC SERPL-MCNC: 122 MG/DL
POTASSIUM SERPL-SCNC: 4.3 MMOL/L (ref 3.5–5.3)
PROT SERPL-MCNC: 7.8 G/DL (ref 6.4–8.2)
SODIUM SERPL-SCNC: 138 MMOL/L (ref 136–145)
T4 FREE SERPL-MCNC: 1.06 NG/DL (ref 0.76–1.46)
TRIGL SERPL-MCNC: 148 MG/DL
TSH SERPL DL<=0.05 MIU/L-ACNC: 1.25 UIU/ML (ref 0.36–3.74)

## 2022-02-03 PROCEDURE — 84443 ASSAY THYROID STIM HORMONE: CPT

## 2022-02-03 PROCEDURE — 83036 HEMOGLOBIN GLYCOSYLATED A1C: CPT

## 2022-02-03 PROCEDURE — 84439 ASSAY OF FREE THYROXINE: CPT

## 2022-02-03 PROCEDURE — 80061 LIPID PANEL: CPT

## 2022-02-03 PROCEDURE — 80053 COMPREHEN METABOLIC PANEL: CPT

## 2022-02-11 ENCOUNTER — HOSPITAL ENCOUNTER (OUTPATIENT)
Dept: NUCLEAR MEDICINE | Facility: HOSPITAL | Age: 82
Discharge: HOME | End: 2022-02-11
Attending: INTERNAL MEDICINE
Payer: COMMERCIAL

## 2022-02-11 ENCOUNTER — DOCUMENTATION (OUTPATIENT)
Dept: CARDIOLOGY CLINIC | Facility: CLINIC | Age: 82
End: 2022-02-11

## 2022-02-11 ENCOUNTER — HOSPITAL ENCOUNTER (OUTPATIENT)
Dept: NON INVASIVE DIAGNOSTICS | Facility: HOSPITAL | Age: 82
Discharge: HOME | End: 2022-02-11
Attending: INTERNAL MEDICINE
Payer: COMMERCIAL

## 2022-02-11 VITALS
HEART RATE: 66 BPM | WEIGHT: 195 LBS | BODY MASS INDEX: 40.93 KG/M2 | DIASTOLIC BLOOD PRESSURE: 90 MMHG | HEIGHT: 58 IN | SYSTOLIC BLOOD PRESSURE: 160 MMHG | OXYGEN SATURATION: 97 % | RESPIRATION RATE: 16 BRPM

## 2022-02-11 DIAGNOSIS — I25.10 ATHEROSCLEROSIS OF NATIVE CORONARY ARTERY OF NATIVE HEART WITHOUT ANGINA PECTORIS: ICD-10-CM

## 2022-02-11 DIAGNOSIS — Z95.5 H/O HEART ARTERY STENT: ICD-10-CM

## 2022-02-11 DIAGNOSIS — Z01.810 PRE-PROCEDURAL CARDIOVASCULAR EXAMINATION: ICD-10-CM

## 2022-02-11 LAB
ARRHY DURING EX: NORMAL
CHEST PAIN STATEMENT: NORMAL
MAX DIASTOLIC BP: 90 MMHG
MAX HEART RATE: 89 BPM
MAX PREDICTED HEART RATE: 141 BPM
MAX. SYSTOLIC BP: 174 MMHG
NUC STRESS EJECTION FRACTION: 70 %
PROTOCOL NAME: NORMAL
RATE PRESSURE PRODUCT: NORMAL
REASON FOR TERMINATION: NORMAL
SL CV REST NUCLEAR ISOTOPE DOSE: 10 MCI
SL CV STRESS NUCLEAR ISOTOPE DOSE: 32.2 MCI
SL CV STRESS RECOVERY BP: NORMAL MMHG
SL CV STRESS RECOVERY HR: 72 BPM
SL CV STRESS RECOVERY O2 SAT: 99 %
STRESS ANGINA INDEX: 0
STRESS BASELINE BP: NORMAL MMHG
STRESS BASELINE HR: 66 BPM
STRESS O2 SAT REST: 97 %
STRESS PEAK HR: 89 BPM
STRESS POST O2 SAT PEAK: 99 %
STRESS POST PEAK BP: 170 MMHG
TARGET HR FORMULA: NORMAL
TEST INDICATION: NORMAL
TIME IN EXERCISE PHASE: NORMAL

## 2022-02-11 PROCEDURE — 93018 CV STRESS TEST I&R ONLY: CPT | Performed by: INTERNAL MEDICINE

## 2022-02-11 PROCEDURE — G1004 CDSM NDSC: HCPCS

## 2022-02-11 PROCEDURE — A9502 TC99M TETROFOSMIN: HCPCS

## 2022-02-11 PROCEDURE — 78452 HT MUSCLE IMAGE SPECT MULT: CPT

## 2022-02-11 PROCEDURE — 93017 CV STRESS TEST TRACING ONLY: CPT

## 2022-02-11 PROCEDURE — 93016 CV STRESS TEST SUPVJ ONLY: CPT | Performed by: INTERNAL MEDICINE

## 2022-02-11 PROCEDURE — 78452 HT MUSCLE IMAGE SPECT MULT: CPT | Performed by: INTERNAL MEDICINE

## 2022-02-11 RX ADMIN — REGADENOSON 0.4 MG: 0.08 INJECTION, SOLUTION INTRAVENOUS at 10:31

## 2022-02-11 NOTE — PROGRESS NOTES
Patient cleared for orthopedic surgery on 3/3/22 with Dr Nicolette De Santiago ([address]) per Dr Jordon Bustillos on [facility] 2/11/22  Last office note and [facility] with clearance faxed to [phone]

## 2022-06-14 NOTE — PROGRESS NOTES
PT Evaluation     Today's date: 2022  Patient name: Elida Tobin  : 1940  MRN: [id number]  Referring provider: Karen Warner PA-C  Dx:   Encounter Diagnosis     ICD-10-CM    1  Stenosis of cervical spine region  M48 02    2  Gait abnormality  R26 9    3  Cervical spondylosis without myelopathy  M47 812    4  S/P cervical spinal fusion  Z98 1                   Assessment  Assessment details: Elida Tobin is a 80 y o  female with a history of B/L TKA, CAD,dyslipidemia, HTN, SVT, type 2 DM, and BMI>30 that presents for a high complexity physical therapy initial evaluation  The patient demonstrates signs and symptoms consistent with cervical stenosis, gait abnormality, cervical spondylosis without myelopathy s/p ACDF C3-C6 on 3/3/22  During the examination the patient demonstrated decreased postural/core/R LE strength, decreased balance, gait abnormality, activity intolerance, and R LE pain  The patient's impairments are causing the following functional limitations: difficulty with prolonged standing, prolonged walking, walking on unlevel surfaces, difficulty squatting/kneeling, difficulty stair-climbing, and difficulty transferring from low surfaces  The patient's clinical presentation is unstable due to a number of participation restrictions, significant medial history, and functional limitation (FOTO 41% function)  The patient will benefit from skilled PT services to address impairments, work towards goals, and restore PLOF  Impairments: abnormal gait, abnormal or restricted ROM, activity intolerance, impaired physical strength, lacks appropriate home exercise program, pain with function and poor posture   Functional limitations: difficulty with prolonged standing, prolonged walking, walking on unlevel surfaces, difficulty squatting/kneeling, difficulty stair-climbing, and difficulty transferring from low surfaces    Symptom irritability: moderateBarriers to therapy: Chronicity of condition  Understanding of Dx/Px/POC: fair   Prognosis: fair  Prognosis details: AGE; MEDICAL HX    Goals  STG: Achieve in 4-6 weeks  1  Patient's R thigh pain at worst is no greater than 3/10 to improve activity tolerance  2   Patient's cervical/thoracic improve to "minimal restriction" all motions tested to allow for function ROM with ADL's   3   Patient's R LE  MMT improve by 1/2-1 muscle grade to improve ambulation endurance for shopping/leisure  4   Patient's tandem stance balance improve to >20 seconds without LOB B/L LE to improve balance for gait  5    Timed up and Go score improve to less than 14 seconds to indicate decreased falls risk/improved balance  LTG: Achieve in 6-12 weeks  1  Patient tolerate walking in the community without R LE pain/ gait instability to achieve their personal therapy goal   2   Patient's strength at core/R LE improve to [employer] to allow completion of leisure activities  3    Patient to be independent with home exercise plan at time of discharge  4   Patient's FOTO score improve to > 55% to indicate a return to normal function  5  30 second sit to stand score improve by 3 stands (>10 total for age norm) to indicate improved transfers  Plan  Plan details: RE-ASSESS 1X/MONTH  Patient would benefit from: skilled physical therapy  Planned modality interventions: TENS, cryotherapy, thermotherapy: hydrocollator packs and ultrasound  Planned therapy interventions: manual therapy, neuromuscular re-education, patient education, postural training, self care, strengthening, stretching, therapeutic activities, therapeutic exercise, home exercise program, abdominal trunk stabilization, activity modification, balance and body mechanics training  Frequency: 1-3x/wk    Duration in weeks: 12  Plan of Care beginning date: 6/16/2022  Plan of Care expiration date: 9/15/2022  Treatment plan discussed with: PTA and patient        Subjective Evaluation    History of Present Illness  Date of surgery: 3/3/2022  Mechanism of injury: surgery  Mechanism of injury: Loy Wolfe is a 80 y o  female that presents to outpatient physical therapy with complaints of R leg pain and difficulty walking  The patient reports these symptoms have been present for the past 2-3 years  The patient recently received a multiple level anterior cervical fusion ( ACDF C3-C6) on 3/3/2022 done by Dr CAMPBELL  The patient denies any post operative complications/issues  The patient had a F/U with the surgery team and the patient complained of persistent difficulty with walking/steadiness, and R LE strength  The patient notes difficulty with prolonged standing/walking/walking on unlevel surfaces/transferring up from a low seat  The patient was given a referral for C-spine stenosis and gait instability  The patient would like to focus on her gait  The patient's main goal for physical therapy is to " walk better"  Pain  Current pain ratin  At best pain rating: 3  At worst pain ratin  Location: R ant thigh to knee  Quality: dull ache  Relieving factors: medications  Progression: no change    Social Support  Steps to enter house: yes  1  Stairs in house: yes   2  Lives in: [facility]  Lives with: alone    Hand dominance: right    Treatments  Current treatment: physical therapy  Patient Goals  Patient goals for therapy: decreased pain, increased motion, increased strength, independence with ADLs/IADLs, return to sport/leisure activities and improved balance  Patient goal: walk better        Objective     Concurrent Complaints  Negative for night pain, disturbed sleep, dizziness, faints, headaches, nausea/motion sickness, tinnitus, trouble swallowing, difficulty breathing, shortness of breath, respiratory pain, visual change, history of cancer, history of trauma and infection    Static Posture     Head  Forward  Shoulders  Rounded  Thoracic Spine  Hyperkyphosis  Lumbar Spine   Flattened     Lumbar spine (Left): Shifted  Pelvis   Posterior pelvic tilt    Palpation     Additional Palpation Details  NO TTP    Neurological Testing     Sensation   Cervical/Thoracic   Left   Intact: light touch  Paresthesia: light touch    Right   Intact: light touch  Paresthesia: light touch    Comments   Left light touch: fingertips  Right light touch: fingertips       Reflexes   Left   Burnett's reflex: negative    Right   Burnett's reflex: negative    Active Range of Motion   Cervical/Thoracic Spine       Cervical    Flexion:  Restriction level: minimal  Extension:  Restriction level: minimal  Left lateral flexion:  Restriction level: moderate  Right lateral flexion:  Restriction level moderate  Left rotation:  Restriction level: minimal  Right rotation:  Restriction level: minimal    Thoracic    Flexion:  Restriction level: minimal  Extension:  Restriction level: moderate    Strength/Myotome Testing   Cervical Spine     Left   Interossei strength (t1): 4    Right   Interossei strength (t1): 4    Left Shoulder     Planes of Motion   Flexion: 4   External rotation at 0°: 4     Right Shoulder     Planes of Motion   Flexion: 4   External rotation at 0°: 4     Left Elbow   Flexion: 5  Extension: 5    Right Elbow   Flexion: 5  Extension: 5    Left Wrist/Hand   Wrist extension: 5  Wrist flexion: 5  Thumb extension: 4    Right Wrist/Hand   Wrist extension: 5  Wrist flexion: 5  Thumb extension: 4    Left Hip   Planes of Motion   Flexion: 4  Extension: 4  Abduction: 4  Adduction: 4    Right Hip   Planes of Motion   Flexion: 3+  Extension: 3+  Abduction: 3+  Adduction: 3+    Left Knee   Flexion: 4  Extension: 4    Right Knee   Flexion: 3+  Extension: 3+    Left Ankle/Foot   Dorsiflexion: 5  Plantar flexion: 5    Right Ankle/Foot   Dorsiflexion: 4  Plantar flexion: 4    Muscle Activation     Additional Muscle Activation Details  Decreased TrA, multifidus, gluteal activation with position changes      Tests     Additional Tests Details  FOTO: 41% ( predicted 53%)    30SSTS: 7 stands used B/L hands ( 10 is age norm)    Gait impairments: Patient ambulates with SPC WBAT B/L LE  The patient demonstrates slow gait speed, unequal step lengths, (+) L LE limp, (+) R LE trunk lean, wide TRI,  R lateral trunk lean,  and decreased heel-toe rollover R LE     TU seconds w/ SPC  Tandem stand:(+) LOB attempting R foot back;  L foot back LOB after 5 seconds  Tandem gait: patient unable to attempt due to LOB  Neuro Exam:     Headaches   Patient reports headaches: No                 Precautions: RECENT C-SPINE FUSION C3-C6 ON 3/3/2022  RE:     Precautions: FALLS - USE BELT  [id number]     Specialty Daily Treatment Diary     Manual                                                     Ther Ex                        LAQ B/L x15 :05       Stand UBE ALT 90/70        Bridges  *              T-spine/L-spine extensions  *T-spine  L-spine      Nu -step towel roll S= 12  *L3      Neuro Re-Ed        Alt step taps XL  *      Foam Balance feet together EO                       EC  *      Towel roll education DONE AD       Tandem Stand EO 1x:05 *      Core brace / belly pull in x10 seated       kegels x10 seated       multifidi  *      Blue foam step overs // bars  *              Seated bubble marches  *      Gait Training        Heel-toe amb        Hurdles Amb OBO  *Over       Shop walk  *      6 minute walk with cues  *      SPC gait in gym Switched cane to L LE and gait improved 25ft x 2       sidestepping  *              Ther Activity        Chair Squats x7 w/ 2 hands *      Up/down steps SOS                        Chair squats with step taps        Step ups fwd/lat  *R 4"          Modalities                                    The patient was given a new home exercise plan with written handout, pictures, and verbal instruction    The patient accepts and understands the new home activities

## 2022-06-16 ENCOUNTER — EVALUATION (OUTPATIENT)
Dept: PHYSICAL THERAPY | Facility: CLINIC | Age: 82
End: 2022-06-16
Payer: COMMERCIAL

## 2022-06-16 DIAGNOSIS — R26.9 GAIT ABNORMALITY: ICD-10-CM

## 2022-06-16 DIAGNOSIS — M48.02 STENOSIS OF CERVICAL SPINE REGION: Primary | ICD-10-CM

## 2022-06-16 DIAGNOSIS — Z98.1 S/P CERVICAL SPINAL FUSION: ICD-10-CM

## 2022-06-16 DIAGNOSIS — M47.812 CERVICAL SPONDYLOSIS WITHOUT MYELOPATHY: ICD-10-CM

## 2022-06-16 PROCEDURE — 97163 PT EVAL HIGH COMPLEX 45 MIN: CPT | Performed by: PHYSICAL THERAPIST

## 2022-06-16 PROCEDURE — 97112 NEUROMUSCULAR REEDUCATION: CPT | Performed by: PHYSICAL THERAPIST

## 2022-06-16 NOTE — LETTER
2022    Rosio Chester PA-C  [address] [place] [id number]    Patient: Nadeem Mccullough   YOB: 1940   Date of Visit: 2022     Encounter Diagnosis     ICD-10-CM    1  Stenosis of cervical spine region  M48 02    2  Gait abnormality  R26 9    3  Cervical spondylosis without myelopathy  M47 812    4  S/P cervical spinal fusion  Z98 1        Dear Dr Lynsey Benitez: Thank you for your recent referral of Nadeem Mccullough  Please review the attached evaluation summary from Cele's recent visit  Please verify that you agree with the plan of care by signing the attached order  If you have any questions or concerns, please do not hesitate to call  I sincerely appreciate the opportunity to share in the care of one of your patients and hope to have another opportunity to work with you in the near future  Sincerely,    Efraín Mora, PT      Referring Provider:      I certify that I have read the below Plan of Care and certify the need for these services furnished under this plan of treatment while under my care  Rosio Chester PA-C  [address] [place] [phone]  Via Fax: [phone]          PT Evaluation     Today's date: 2022  Patient name: Nadeem Mccullough  : 1940  MRN: [id number]  Referring provider: Esthela Zendejas  Dx:   Encounter Diagnosis     ICD-10-CM    1  Stenosis of cervical spine region  M48 02    2  Gait abnormality  R26 9    3  Cervical spondylosis without myelopathy  M47 812    4  S/P cervical spinal fusion  Z98 1                   Assessment  Assessment details: Nadeem Mccullough is a 80 y o  female with a history of B/L TKA, CAD,dyslipidemia, HTN, SVT, type 2 DM, and BMI>30 that presents for a high complexity physical therapy initial evaluation    The patient demonstrates signs and symptoms consistent with cervical stenosis, gait abnormality, cervical spondylosis without myelopathy s/p ACDF C3-C6 on 3/3/22  During the examination the patient demonstrated decreased postural/core/R LE strength, decreased balance, gait abnormality, activity intolerance, and R LE pain  The patient's impairments are causing the following functional limitations: difficulty with prolonged standing, prolonged walking, walking on unlevel surfaces, difficulty squatting/kneeling, difficulty stair-climbing, and difficulty transferring from low surfaces  The patient's clinical presentation is unstable due to a number of participation restrictions, significant medial history, and functional limitation (FOTO 41% function)  The patient will benefit from skilled PT services to address impairments, work towards goals, and restore PLOF  Impairments: abnormal gait, abnormal or restricted ROM, activity intolerance, impaired physical strength, lacks appropriate home exercise program, pain with function and poor posture   Functional limitations: difficulty with prolonged standing, prolonged walking, walking on unlevel surfaces, difficulty squatting/kneeling, difficulty stair-climbing, and difficulty transferring from low surfaces  Symptom irritability: moderateBarriers to therapy: Chronicity of condition  Understanding of Dx/Px/POC: fair   Prognosis: fair  Prognosis details: AGE; MEDICAL HX    Goals  STG: Achieve in 4-6 weeks  1  Patient's R thigh pain at worst is no greater than 3/10 to improve activity tolerance  2   Patient's cervical/thoracic improve to "minimal restriction" all motions tested to allow for function ROM with ADL's   3   Patient's R LE  MMT improve by 1/2-1 muscle grade to improve ambulation endurance for shopping/leisure  4   Patient's tandem stance balance improve to >20 seconds without LOB B/L LE to improve balance for gait  5    Timed up and Go score improve to less than 14 seconds to indicate decreased falls risk/improved balance  LTG: Achieve in 6-12 weeks  1    Patient tolerate walking in the community without R LE pain/ gait instability to achieve their personal therapy goal   2   Patient's strength at core/R LE improve to [facility] to allow completion of leisure activities  3    Patient to be independent with home exercise plan at time of discharge  4   Patient's FOTO score improve to > 55% to indicate a return to normal function  5  30 second sit to stand score improve by 3 stands (>10 total for age norm) to indicate improved transfers  Plan  Plan details: RE-ASSESS 1X/MONTH  Patient would benefit from: skilled physical therapy  Planned modality interventions: TENS, cryotherapy, thermotherapy: hydrocollator packs and ultrasound  Planned therapy interventions: manual therapy, neuromuscular re-education, patient education, postural training, self care, strengthening, stretching, therapeutic activities, therapeutic exercise, home exercise program, abdominal trunk stabilization, activity modification, balance and body mechanics training  Frequency: 1-3x/wk  Duration in weeks: 12  Plan of Care beginning date: 6/16/2022  Plan of Care expiration date: 9/15/2022  Treatment plan discussed with: PTA and patient        Subjective Evaluation    History of Present Illness  Date of surgery: 3/3/2022  Mechanism of injury: surgery  Mechanism of injury: Myla Faust is a 80 y o  female that presents to outpatient physical therapy with complaints of R leg pain and difficulty walking  The patient reports these symptoms have been present for the past 2-3 years  The patient recently received a multiple level anterior cervical fusion ( ACDF C3-C6) on 3/3/2022 done by Dr Yael Gill  The patient denies any post operative complications/issues  The patient had a F/U with the surgery team and the patient complained of persistent difficulty with walking/steadiness, and R LE strength  The patient notes difficulty with prolonged standing/walking/walking on unlevel surfaces/transferring up from a low seat  The patient was given a referral for C-spine stenosis and gait instability  The patient would like to focus on her gait  The patient's main goal for physical therapy is to " walk better"  Pain  Current pain ratin  At best pain rating: 3  At worst pain ratin  Location: R ant thigh to knee  Quality: dull ache  Relieving factors: medications  Progression: no change    Social Support  Steps to enter house: yes  1  Stairs in house: yes   2  Lives in: [facility]  Lives with: alone    Hand dominance: right    Treatments  Current treatment: physical therapy  Patient Goals  Patient goals for therapy: decreased pain, increased motion, increased strength, independence with ADLs/IADLs, return to sport/leisure activities and improved balance  Patient goal: walk better        Objective     Concurrent Complaints  Negative for night pain, disturbed sleep, dizziness, faints, headaches, nausea/motion sickness, tinnitus, trouble swallowing, difficulty breathing, shortness of breath, respiratory pain, visual change, history of cancer, history of trauma and infection    Static Posture     Head  Forward  Shoulders  Rounded  Thoracic Spine  Hyperkyphosis  Lumbar Spine   Flattened  Lumbar spine (Left): Shifted  Pelvis   Posterior pelvic tilt    Palpation     Additional Palpation Details  NO TTP    Neurological Testing     Sensation   Cervical/Thoracic   Left   Intact: light touch  Paresthesia: light touch    Right   Intact: light touch  Paresthesia: light touch    Comments   Left light touch: fingertips  Right light touch: fingertips       Reflexes   Left   Burnett's reflex: negative    Right   Burnett's reflex: negative    Active Range of Motion   Cervical/Thoracic Spine       Cervical    Flexion:  Restriction level: minimal  Extension:  Restriction level: minimal  Left lateral flexion:  Restriction level: moderate  Right lateral flexion:  Restriction level moderate  Left rotation:  Restriction level: minimal  Right rotation:  Restriction level: minimal    Thoracic    Flexion:  Restriction level: minimal  Extension:  Restriction level: moderate    Strength/Myotome Testing   Cervical Spine     Left   Interossei strength (t1): 4    Right   Interossei strength (t1): 4    Left Shoulder     Planes of Motion   Flexion: 4   External rotation at 0°: 4     Right Shoulder     Planes of Motion   Flexion: 4   External rotation at 0°: 4     Left Elbow   Flexion: 5  Extension: 5    Right Elbow   Flexion: 5  Extension: 5    Left Wrist/Hand   Wrist extension: 5  Wrist flexion: 5  Thumb extension: 4    Right Wrist/Hand   Wrist extension: 5  Wrist flexion: 5  Thumb extension: 4    Left Hip   Planes of Motion   Flexion: 4  Extension: 4  Abduction: 4  Adduction: 4    Right Hip   Planes of Motion   Flexion: 3+  Extension: 3+  Abduction: 3+  Adduction: 3+    Left Knee   Flexion: 4  Extension: 4    Right Knee   Flexion: 3+  Extension: 3+    Left Ankle/Foot   Dorsiflexion: 5  Plantar flexion: 5    Right Ankle/Foot   Dorsiflexion: 4  Plantar flexion: 4    Muscle Activation     Additional Muscle Activation Details  Decreased TrA, multifidus, gluteal activation with position changes      Tests     Additional Tests Details  FOTO: 41% ( predicted 53%)    30SSTS: 7 stands used B/L hands ( 10 is age norm)    Gait impairments: Patient ambulates with SPC WBAT B/L LE    The patient demonstrates slow gait speed, unequal step lengths, (+) L LE limp, (+) R LE trunk lean, wide TRI,  R lateral trunk lean,  and decreased heel-toe rollover R LE     TU seconds w/ SPC  Tandem stand:(+) LOB attempting R foot back;  L foot back LOB after 5 seconds  Tandem gait: patient unable to attempt due to LOB  Neuro Exam:     Headaches   Patient reports headaches: No                 Precautions: RECENT C-SPINE FUSION C3-C6 ON 3/3/2022  RE:     Precautions: FALLS - USE BELT  [id number]     Specialty Daily Treatment Diary     Manual Ther Ex 6/16                       LAQ B/L x15 :05       Stand UBE ALT 90/70        Bridges  *              T-spine/L-spine extensions  *T-spine  L-spine      Nu -step towel roll S= 12  *L3      Neuro Re-Ed        Alt step taps XL  *      Foam Balance feet together EO                       EC  *      Towel roll education DONE AD       Tandem Stand EO 1x:05 *      Core brace / belly pull in x10 seated       kegels x10 seated       multifidi  *      Blue foam step overs // bars  *              Seated bubble marches  *      Gait Training        Heel-toe amb        Hurdles Amb OBO  *Over       Shop walk  *      6 minute walk with cues  *      SPC gait in gym Switched cane to L LE and gait improved 25ft x 2       sidestepping  *              Ther Activity        Chair Squats x7 w/ 2 hands *      Up/down steps SOS                        Chair squats with step taps        Step ups fwd/lat  *R 4"          Modalities                                    The patient was given a new home exercise plan with written handout, pictures, and verbal instruction    The patient accepts and understands the new home activities

## 2022-06-22 ENCOUNTER — OFFICE VISIT (OUTPATIENT)
Dept: PHYSICAL THERAPY | Facility: CLINIC | Age: 82
End: 2022-06-22
Payer: COMMERCIAL

## 2022-06-22 DIAGNOSIS — R26.9 GAIT ABNORMALITY: ICD-10-CM

## 2022-06-22 DIAGNOSIS — M48.02 STENOSIS OF CERVICAL SPINE REGION: Primary | ICD-10-CM

## 2022-06-22 DIAGNOSIS — M47.812 CERVICAL SPONDYLOSIS WITHOUT MYELOPATHY: ICD-10-CM

## 2022-06-22 PROCEDURE — 97112 NEUROMUSCULAR REEDUCATION: CPT

## 2022-06-22 PROCEDURE — 97110 THERAPEUTIC EXERCISES: CPT

## 2022-06-22 PROCEDURE — 97530 THERAPEUTIC ACTIVITIES: CPT

## 2022-06-22 NOTE — PROGRESS NOTES
Daily Note     Today's date: 2022  Patient name: Janae Reyes  : 1940  MRN: [id number]  Referring provider: Arlyn Rosenthal PA-C  Dx:   Encounter Diagnosis     ICD-10-CM    1  Stenosis of cervical spine region  M48 02    2  Gait abnormality  R26 9    3  Cervical spondylosis without myelopathy  M47 812        Start Time: 1035          Subjective: Patient states she has no pain today  Patient states she only uses her walker at home  Objective: See treatment diary below      Assessment: Tolerated treatment fair+  Patient would benefit from continued PT for stretching and strengthening  Patient ws able to add exercises to her program with little difficulty and no pain  She fatigues quickly with walking  She is unsteady and has a tendency to drag her feet with certain walking activities  She is able to lift foot for swing through with verbal cues  Patient is unsteady at times with her cane and she is a fall risk  Fatigue and her breathing was the cause of her rest during 6 min walk  She felt tired by the end of the session  Plan: Continue per plan of care  Progress treatment as tolerated           Precautions: RECENT C-SPINE FUSION C3-C6 ON 3/3/2022  RE:     Precautions: FALLS - USE BELT  [id number]     Specialty Daily Treatment Diary     Manual                                                     Ther Ex                       LAQ B/L x15 :05 B/L x15 ea      Stand UBE ALT 90/70        Bridges   *             T-spine/L-spine extensions  *T-spine xx  L-spine x10      Nu -step towel roll S= 10  *L3 x5 min      Neuro Re-Ed        Alt step taps XL  *4" 1H x10 ea      Foam Balance feet together EO                       EC  *  1H :30x2    1H :30x2      Towel roll education DONE AD       Tandem Stand EO 1x:05 *:15x2 ea 1H      Core brace / belly pull in x10 seated np      kegels x10 seated np      multifidi  *stand x10      Blue foam step overs // bars  *x10 ea              Seated bubble marches  *x10      Gait Training        Heel-toe amb        Hurdles Amb OBO  *Over 10ft x4 // bars      Shop walk  *// bars 10 ft x4 ea 1H      6 minute walk with cues  *at end  3 laps, rest,2 laps      SPC gait in gym Switched cane to L LE and gait improved 25ft x 2       sidestepping  *// bars 10 ft x2              Ther Activity        Chair Squats x7 w/ 2 hands * x8      Up/down steps SOS                        Chair squats with step taps        Step ups fwd/lat  *R 4" x10          Modalities

## 2022-06-24 ENCOUNTER — OFFICE VISIT (OUTPATIENT)
Dept: PHYSICAL THERAPY | Facility: CLINIC | Age: 82
End: 2022-06-24
Payer: COMMERCIAL

## 2022-06-24 DIAGNOSIS — M48.02 STENOSIS OF CERVICAL SPINE REGION: Primary | ICD-10-CM

## 2022-06-24 DIAGNOSIS — M47.812 CERVICAL SPONDYLOSIS WITHOUT MYELOPATHY: ICD-10-CM

## 2022-06-24 DIAGNOSIS — R26.9 GAIT ABNORMALITY: ICD-10-CM

## 2022-06-24 DIAGNOSIS — Z98.1 S/P CERVICAL SPINAL FUSION: ICD-10-CM

## 2022-06-24 PROCEDURE — 97116 GAIT TRAINING THERAPY: CPT | Performed by: PHYSICAL THERAPIST

## 2022-06-24 PROCEDURE — 97112 NEUROMUSCULAR REEDUCATION: CPT | Performed by: PHYSICAL THERAPIST

## 2022-06-24 PROCEDURE — 97110 THERAPEUTIC EXERCISES: CPT | Performed by: PHYSICAL THERAPIST

## 2022-06-24 NOTE — PROGRESS NOTES
Daily Note     Today's date: 2022  Patient name: Cary Fish  : 1940  MRN: [id number]  Referring provider: Laverne Marinelli PA-C  Dx:   Encounter Diagnosis     ICD-10-CM    1  Stenosis of cervical spine region  M48 02    2  Gait abnormality  R26 9    3  Cervical spondylosis without myelopathy  M47 812    4  S/P cervical spinal fusion  Z98 1                   Subjective: The patient denies feeling any pain and notes feeling      Objective: See treatment diary below      Assessment: The patient relies on her L UE to maintain her balance with the exercises and gait activities  The patient shifts her weight to the L LE when performing standing balance activities  The patient was given VC to correct her posture and technique during the completion of her exercises  The patient will benefit from continued PT to achieve her goals of therapy  Plan: Continue per plan of care  Will try CSMi balance next visit to improve equal weightbearing R and L LE         Precautions: RECENT C-SPINE FUSION C3-C6 ON 3/3/2022  RE:     Precautions: FALLS - USE BELT  [id number]     Specialty Daily Treatment Diary     Manual                                                    Ther Ex                      LAQ B/L x15 :05 B/L x15 ea B/L x15     Stand UBE ALT 90/70        Bridges   *             T-spine/L-spine extensions  *T-spine xx  L-spine x10 L-spine x10     Nu -step towel roll S= 9  *L3 x5 min L3 x 7 mins     Neuro Re-Ed        Alt step taps XL  *4" 1H x10 ea 6" x5  4" x10 1H     Foam Balance feet together EO                       EC  *  1H :30x2    1H :30x2 2x:30 ea EO/EC BLUE     Towel roll education DONE AD       Tandem Stand EO 1x:05 *:15x2 ea 1H :30 x 1 ea     Core brace / belly pull in x10 seated np      kegels x10 seated np      multifidi  *stand x10      Blue foam step overs // bars  *x10 ea x10 ea B/L     CSMi weight shift    *    Seated bubble marches  *x10      Gait Training Heel-toe amb   // bar 10ft x2     Hurdles Amb OBO  *Over 10ft x4 // bars Over 10ft x4 // bar     Shop walk  *// bars 10 ft x4 ea 1H // bar 10ft x 4     6 minute walk with cues  *at end  3 laps, rest,2 laps Done 4 laps 1 seated rest SPC     SPC gait in gym Switched cane to L LE and gait improved 25ft x 2       sidestepping  *// bars 10 ft x2 // bar 10ft x 4             Ther Activity        Chair Squats x7 w/ 2 hands * x8 x10     Up/down steps SOS                        Chair squats with step taps        Step ups fwd/lat  *R 4" x10 R 4" x10         Modalities

## 2022-06-27 ENCOUNTER — OFFICE VISIT (OUTPATIENT)
Dept: PHYSICAL THERAPY | Facility: CLINIC | Age: 82
End: 2022-06-27
Payer: COMMERCIAL

## 2022-06-27 DIAGNOSIS — M48.02 STENOSIS OF CERVICAL SPINE REGION: Primary | ICD-10-CM

## 2022-06-27 DIAGNOSIS — R26.9 GAIT ABNORMALITY: ICD-10-CM

## 2022-06-27 DIAGNOSIS — M47.812 CERVICAL SPONDYLOSIS WITHOUT MYELOPATHY: ICD-10-CM

## 2022-06-27 PROCEDURE — 97530 THERAPEUTIC ACTIVITIES: CPT

## 2022-06-27 PROCEDURE — 97110 THERAPEUTIC EXERCISES: CPT

## 2022-06-27 PROCEDURE — 97112 NEUROMUSCULAR REEDUCATION: CPT

## 2022-06-27 NOTE — PROGRESS NOTES
Daily Note     Today's date: 2022  Patient name: Jose Burnett  : 1940  MRN: [id number]  Referring provider: Paula Jean PA-C  Dx:   Encounter Diagnosis     ICD-10-CM    1  Stenosis of cervical spine region  M48 02    2  Gait abnormality  R26 9    3  Cervical spondylosis without myelopathy  M47 812                   Subjective: Patient states she has no pain  She gets aches at times, but no pain  Objective: See treatment diary below    Patient's home exercise program updated to include additional exercises  Handout issued and explained with demonstration  Patient accepts new exercise  Assessment: Tolerated treatment fair+  Patient would benefit from continued PT for balance and strengthening  Patient was able to add exercises to her program with little difficulty and no pain  She seemed to understand all education on new exercises  Patient felt ok, but a little tired by the end of the session  Plan: Continue per plan of care  Progress treatment as tolerated           Precautions: RECENT C-SPINE FUSION C3-C6 ON 3/3/2022  RE:     Precautions: FALLS - USE BELT  [id number]     Specialty Daily Treatment Diary     Manual                                                   Ther Ex                     LAQ B/L x15 :05 B/L x15 ea B/L x15 HEP    Stand UBE ALT 90/70    120/30 x4 min    Bridges   *             T-spine/L-spine extensions  *T-spine xx  L-spine x10 L-spine x10 Sit :05x10  Stand :05x10    Nu -step towel roll S= 9  *L3 x5 min L3 x 7 mins L3  x8 min    Neuro Re-Ed        Alt step taps XL  *4" 1H x10 ea 6" x5  4" x10 1H 6 " x10 1 H    Foam Balance feet together EO                       EC  *  1H :30x2    1H :30x2 2x:30 ea EO/EC BLUE 2x:30 ea EO 0H/EC 1H BLUE    Towel roll education DONE AD       Tandem Stand EO 1x:05 *:15x2 ea 1H :30 x 1 ea :30 x1 ea    Core brace / belly pull in x10 seated np      kegels x10 seated np      multifidi  *stand x10 Blue foam step overs // bars  *x10 ea x10 ea B/L x10 B/L    CSMi weight shift     *   Seated bubble marches  *x10      Gait Training        Heel-toe amb   // bar 10ft x2 // bar 10ft x4    Hurdles Amb OBO  *Over 10ft x4 // bars Over 10ft x4 // bar Over 10ft x4 // bar    Shop walk  *// bars 10 ft x4 ea 1H // bar 10ft x 4 // bar 10ft x2 ea    6 minute walk with cues  *at end  3 laps, rest,2 laps Done 4 laps 1 seated rest SPC np    SPC gait in gym Switched cane to L LE and gait improved 25ft x 2       sidestepping  *// bars 10 ft x2 // bar 10ft x 4 // bar 10ft x2            Ther Activity        Chair Squats x7 w/ 2 hands * x8 x10 np    Up/down steps SOS                        Chair squats with step taps        Step ups fwd/lat  *R 4" x10 R 4" x10 B/L 4" x10 ea         Modalities                                  Access Code: [email or web address]  URL: [email or web address]  Date: 06/27/2022  Prepared by: Meenu Lozada    Exercises  · Seated Long Arc Quad - 3 x daily - 7 x weekly - 2 sets - 10 reps - 5 hold  · Seated Hamstring Stretch - 2 x daily - 7 x weekly - 1 sets - 4 reps - 15 hold  · Supine Transversus Abdominis Bracing - Hands on Stomach - 2 x daily - 7 x weekly - 10 reps - 2 sets - 5 hold  · Seated Pelvic Floor Contraction - 2 x daily - 7 x weekly - 10 reps - 1 sets - 10 hold  · Seated Upper Thoracic Stretch - 2 x daily - 7 x weekly - 1 sets - 10 reps - 5 sec hold    Patient Education  · Office Posture

## 2022-06-30 ENCOUNTER — OFFICE VISIT (OUTPATIENT)
Dept: PHYSICAL THERAPY | Facility: CLINIC | Age: 82
End: 2022-06-30
Payer: COMMERCIAL

## 2022-06-30 DIAGNOSIS — Z98.1 S/P CERVICAL SPINAL FUSION: ICD-10-CM

## 2022-06-30 DIAGNOSIS — M48.02 STENOSIS OF CERVICAL SPINE REGION: Primary | ICD-10-CM

## 2022-06-30 DIAGNOSIS — M47.812 CERVICAL SPONDYLOSIS WITHOUT MYELOPATHY: ICD-10-CM

## 2022-06-30 DIAGNOSIS — R26.9 GAIT ABNORMALITY: ICD-10-CM

## 2022-06-30 PROCEDURE — 97110 THERAPEUTIC EXERCISES: CPT | Performed by: PHYSICAL THERAPIST

## 2022-06-30 PROCEDURE — 97116 GAIT TRAINING THERAPY: CPT | Performed by: PHYSICAL THERAPIST

## 2022-06-30 PROCEDURE — 97112 NEUROMUSCULAR REEDUCATION: CPT | Performed by: PHYSICAL THERAPIST

## 2022-06-30 NOTE — PROGRESS NOTES
Daily Note     Today's date: 2022  Patient name: Jose Burnett  : 1940  MRN: [id number]  Referring provider: Paula Jean PA-C  Dx:   Encounter Diagnosis     ICD-10-CM    1  Stenosis of cervical spine region  M48 02    2  Gait abnormality  R26 9    3  Cervical spondylosis without myelopathy  M47 812    4  S/P cervical spinal fusion  Z98 1                   Subjective: The patient is not having any pain but notes fatigue at her R LE with functional activities  Objective: See treatment diary below      Assessment: The patient tolerated all activities well today  The patient had a mild LOB during the 6-minute walk test while turning  The patient was advised to experiment at home performing trunk flexion stretches versus extensions stretches to determine the effect in regards to her R anterior thigh pain  The patient understands and accepts  There were no complaints of increased pain or problems after the session today  The patient will benefit from continued skilled physical therapy to progress towards achieving patient centered goals  Plan: Continue per plan of care  Progress treatment as tolerated           Precautions: RECENT C-SPINE FUSION C3-C6 ON 3/3/2022  RE:     Precautions: FALLS - USE BELT  [id number]     Specialty Daily Treatment Diary     Manual                                                  Ther Ex            Repeated flexion in sitting     x10 after walk   LAQ B/L x15 :05 B/L x15 ea B/L x15 HEP    Stand UBE ALT 90/70    120/30 x4 min    Bridges   *             T-spine/L-spine extensions  *T-spine xx  L-spine x10 L-spine x10 Sit :05x10  Stand :05x10 Sit x10  Stand x10   Nu -step towel roll S= 9  *L3 x5 min L3 x 7 mins L3  x8 min    Neuro Re-Ed        Alt step taps XL  *4" 1H x10 ea 6" x5  4" x10 1H 6 " x10 1 H 6" x15 1 H   Foam Balance feet together EO                       EC  *  1H :30x2    1H :30x2 2x:30 ea EO/EC BLUE 2x:30 ea EO 0H/EC 1H BLUE 2x:30 ea EO 0H/EC 1H BLUE   Towel roll education DONE AD       Tandem Stand EO 1x:05 *:15x2 ea 1H :30 x 1 ea :30 x1 ea * HT yes,no eyes fixed and not fixedNV   Core brace / belly pull in x10 seated np      kegels x10 seated np      multifidi  *stand x10      Blue foam step overs // bars  *x10 ea x10 ea B/L x10 B/L x10 B/L   CSMi weight shift     * " +" shift fwd/back and side/side x10 ea 0H   Seated bubble marches  *x10      Gait Training        Heel-toe amb   // bar 10ft x2 // bar 10ft x4 10ft x2   Hurdles Amb OBO  *Over 10ft x4 // bars Over 10ft x4 // bar Over 10ft x4 // bar    Shop walk  *// bars 10 ft x4 ea 1H // bar 10ft x 4 // bar 10ft x2 ea    6 minute walk with cues  *at end  3 laps, rest,2 laps Done 4 laps 1 seated rest SPC np 5 5 laps 2 seated rests  (+) LOB with turn needed CGA to correct balance   SPC gait in gym Switched cane to L LE and gait improved 25ft x 2    Done around obstacles AD   sidestepping  *// bars 10 ft x2 // bar 10ft x 4 // bar 10ft x2    Cone weave SPC     *   Ther Activity        Chair Squats x7 w/ 2 hands * x8 x10 np    Up/down steps SOS                        Chair squats with step taps        Step ups fwd/lat  *R 4" x10 R 4" x10 B/L 4" x10 ea  R x10 fwd/side 4"       Modalities                                  Access Code: [email or web address]  URL: [email or web address]  Date: 06/27/2022  Prepared by: Deanna Lozada    Exercises  · Seated Long Arc Quad - 3 x daily - 7 x weekly - 2 sets - 10 reps - 5 hold  · Seated Hamstring Stretch - 2 x daily - 7 x weekly - 1 sets - 4 reps - 15 hold  · Supine Transversus Abdominis Bracing - Hands on Stomach - 2 x daily - 7 x weekly - 10 reps - 2 sets - 5 hold  · Seated Pelvic Floor Contraction - 2 x daily - 7 x weekly - 10 reps - 1 sets - 10 hold  · Seated Upper Thoracic Stretch - 2 x daily - 7 x weekly - 1 sets - 10 reps - 5 sec hold    Patient Education  · Office Posture

## 2022-07-06 ENCOUNTER — OFFICE VISIT (OUTPATIENT)
Dept: PHYSICAL THERAPY | Facility: CLINIC | Age: 82
End: 2022-07-06
Payer: COMMERCIAL

## 2022-07-06 DIAGNOSIS — R26.9 GAIT ABNORMALITY: ICD-10-CM

## 2022-07-06 DIAGNOSIS — M48.02 STENOSIS OF CERVICAL SPINE REGION: Primary | ICD-10-CM

## 2022-07-06 DIAGNOSIS — M47.812 CERVICAL SPONDYLOSIS WITHOUT MYELOPATHY: ICD-10-CM

## 2022-07-06 PROCEDURE — 97110 THERAPEUTIC EXERCISES: CPT

## 2022-07-06 PROCEDURE — 97112 NEUROMUSCULAR REEDUCATION: CPT

## 2022-07-06 PROCEDURE — 97116 GAIT TRAINING THERAPY: CPT

## 2022-07-06 NOTE — PROGRESS NOTES
Daily Note     Today's date: 2022  Patient name: Karen Medina  : 1940  MRN: [id number]  Referring provider: Jerry Rico PA-C  Dx:   Encounter Diagnosis     ICD-10-CM    1  Stenosis of cervical spine region  M48 02    2  Gait abnormality  R26 9    3  Cervical spondylosis without myelopathy  M47 812        Start Time: 1032          Subjective: Patient states she has no pain today  She feels over all her legs are getting stronger  Objective: See treatment diary below    Patient continues to fatigue quickly with walking exercises  She has trouble maintaining balance during turns and has a tendency to reach out with one hand to furniture to steady her balance  Assessment: Tolerated treatment fair+  Patient would benefit from continued PT for balance and strengthening  Patient was able to add some exercises to her program  She has slight loss of balance with self recovery during turns  She has a tendency to sit quickly if she is fatigued  Patient continues to be a fall risk  She was tired by the end of the session  Plan: Continue per plan of care  Progress treatment as tolerated           Precautions: RECENT C-SPINE FUSION C3-C6 ON 3/3/2022  RE:     Precautions: FALLS - USE BELT  [id number]     Specialty Daily Treatment Diary     Manual  30                                               Ther Ex            Repeated flexion in sitting     x10 after walk   LAQ   B/L x15 HEP    Stand UBE ALT 90/70 120/30 x4 min   120/30 x4 min    Bridges   *             T-spine/L-spine extensions Sit x10  Stand x10  L-spine x10 Sit :05x10  Stand :05x10 Sit x10  Stand x10   Nu -step towel roll S= 9   L3 x 7 mins L3  x8 min    Neuro Re-Ed        Alt step taps XL 6" x15 1 H  6" x5  4" x10 1H 6 " x10 1 H 6" x15 1 H   Foam Balance feet together EO                       EC 2x:30 ea EO 0H/EC 1H BLUE  2x:30 ea EO/EC BLUE 2x:30 ea EO 0H/EC 1H BLUE 2x:30 ea EO 0H/EC 1H BLUE Towel roll education        Tandem Stand EO HT yes,no eyes fixed and not fixed :30x1 ea B/L  :30 x 1 ea :30 x1 ea * HT yes,no eyes fixed and not fixedNV   Core brace / belly pull in        kegels        multifidi        Blue foam step overs // bars x10 B/L  x10 ea B/L x10 B/L x10 B/L   CSMi weight shift (+) shanding 0H x1 min; weight shift  0H x10 ea    * " +" shift fwd/back and side/side x10 ea 0H   Seated bubble marches        Gait Training        Heel-toe amb x10 ft x4  // bar 10ft x2 // bar 10ft x4 10ft x2   Hurdles Amb OBO   Over 10ft x4 // bar Over 10ft x4 // bar    Shop walk   // bar 10ft x 4 // bar 10ft x2 ea    6 minute walk with cues 2 laps, rest, 2 laps, rest, 0 5 laps CGA w/ SPC  Done 4 laps 1 seated rest SPC np 5 5 laps 2 seated rests  (+) LOB with turn needed CGA to correct balance   SPC gait in gym     Done around obstacles AD   sidestepping   // bar 10ft x 4 // bar 10ft x2    Cone weave SPC *25ft x2    *   Ther Activity        Chair Squats   x10 np    Up/down steps SOS                        Chair squats with step taps        Step ups fwd/lat R x10 fwd/side 4"  R 4" x10 B/L 4" x10 ea  R x10 fwd/side 4"       Modalities                                  Access Code: [email or web address]  URL: [email or web address]  Date: 06/27/2022  Prepared by: Giselle Lozada    Exercises  · Seated Long Arc Quad - 3 x daily - 7 x weekly - 2 sets - 10 reps - 5 hold  · Seated Hamstring Stretch - 2 x daily - 7 x weekly - 1 sets - 4 reps - 15 hold  · Supine Transversus Abdominis Bracing - Hands on Stomach - 2 x daily - 7 x weekly - 10 reps - 2 sets - 5 hold  · Seated Pelvic Floor Contraction - 2 x daily - 7 x weekly - 10 reps - 1 sets - 10 hold  · Seated Upper Thoracic Stretch - 2 x daily - 7 x weekly - 1 sets - 10 reps - 5 sec hold    Patient Education  · Office Posture

## 2022-07-08 ENCOUNTER — OFFICE VISIT (OUTPATIENT)
Dept: PHYSICAL THERAPY | Facility: CLINIC | Age: 82
End: 2022-07-08
Payer: COMMERCIAL

## 2022-07-08 DIAGNOSIS — R26.9 GAIT ABNORMALITY: ICD-10-CM

## 2022-07-08 DIAGNOSIS — M48.02 STENOSIS OF CERVICAL SPINE REGION: Primary | ICD-10-CM

## 2022-07-08 DIAGNOSIS — M47.812 CERVICAL SPONDYLOSIS WITHOUT MYELOPATHY: ICD-10-CM

## 2022-07-08 PROCEDURE — 97530 THERAPEUTIC ACTIVITIES: CPT

## 2022-07-08 PROCEDURE — 97110 THERAPEUTIC EXERCISES: CPT

## 2022-07-08 PROCEDURE — 97116 GAIT TRAINING THERAPY: CPT

## 2022-07-08 PROCEDURE — 97112 NEUROMUSCULAR REEDUCATION: CPT

## 2022-07-08 NOTE — PROGRESS NOTES
Daily Note     Today's date: 2022  Patient name: Demetris Esquivel  : 1940  MRN: [id number]  Referring provider: Satish Mosley PA-C  Dx:   Encounter Diagnosis     ICD-10-CM    1  Stenosis of cervical spine region  M48 02    2  Gait abnormality  R26 9    3  Cervical spondylosis without myelopathy  M47 812        Start Time: 1004          Subjective: She states she has no pain and she is not tired today  She felt her right knee has given out 2x this AM already  The right is worse then the left, but overall the knees have improved  Her tingling is now less in the hands since her neck surgery  Objective: See treatment diary below      Assessment: Tolerated treatment fair+  Patient would benefit from continued PT for balance and strengthening  Patient was able to perform her exercises with little difficulty with left knee feeling like it wanted to "give out" (2x)  She continues to be challenged during balance exercises  She walks with forward flexed posture and low floor clearance during swing through  She continues to fatigue with standing exercises  She feels the 6 min walk is the hardest exercise  Patient was tired by the end of the session  Plan: Continue per plan of care  Progress treatment as tolerated           Precautions: RECENT C-SPINE FUSION C3-C6 ON 3/3/2022  RE:     Precautions: FALLS - USE BELT  [id number]     Specialty Daily Treatment Diary     Manual                                                 Ther Ex            Repeated flexion in sitting     x10 after walk   LAQ    HEP    Stand UBE ALT 90/70 120/30 x4 min 120/30 x4 min  120/30 x4 min    Bridges                T-spine/L-spine extensions Sit x10  Stand x10 Sit x10  Stand x10  Sit :05x10  Stand :05x10 Sit x10  Stand x10   Nu -step towel roll S= 9  L3 x8 min  L3  x8 min    Neuro Re-Ed        Alt step taps XL 6" x15 1 H 6" x15 1 H  6 " x10 1 H 6" x15 1 H   Foam Balance feet together EO EC 2x:30 ea EO 0H/EC 1H BLUE 2x:30 ea EO 0H/EC 1H BLUE  2x:30 ea EO 0H/EC 1H BLUE 2x:30 ea EO 0H/EC 1H BLUE   Towel roll education        Tandem Stand EO HT yes,no eyes fixed and not fixed :30x1 ea B/L HT yes,no eyes fixed and not fixed :30x1 ea B/L  :30 x1 ea * HT yes,no eyes fixed and not fixedNV   Core brace / belly pull in  Sit x10      kegels        multifidi        Blue foam step overs // bars x10 B/L x10 B/L  x10 B/L x10 B/L   CSMi weight shift (+) shanding 0H x1 min; weight shift  0H x10 ea np   * " +" shift fwd/back and side/side x10 ea 0H   Seated bubble marches        Gait Training        Heel-toe amb x10 ft x4 10 ft x4  // bar 10ft x4 10ft x2   Hurdles Amb OBO    Over 10ft x4 // bar    Shop walk    // bar 10ft x2 ea    6 minute walk with cues 2 laps, rest, 2 laps, rest, 0 5 laps CGA w/ SPC 2 5 laps, rest 2 laps CGA w/ SPC  np 5 5 laps 2 seated rests  (+) LOB with turn needed CGA to correct balance   SPC gait in gym     Done around obstacles AD   sidestepping    // bar 10ft x2    Cone weave SPC *25ft x2 25ft x3   *   Ther Activity        Chair Squats    np    Up/down steps SOS                        Chair squats with step taps        Step ups fwd/lat R x10 fwd/side 4" R x10 fwd/side 4"  B/L 4" x10 ea  R x10 fwd/side 4"       Modalities                                  Access Code: [email or web address]  URL: [email or web address]  Date: 06/27/2022  Prepared by: Palmira Lozada    Exercises  · Seated Long Arc Quad - 3 x daily - 7 x weekly - 2 sets - 10 reps - 5 hold  · Seated Hamstring Stretch - 2 x daily - 7 x weekly - 1 sets - 4 reps - 15 hold  · Supine Transversus Abdominis Bracing - Hands on Stomach - 2 x daily - 7 x weekly - 10 reps - 2 sets - 5 hold  · Seated Pelvic Floor Contraction - 2 x daily - 7 x weekly - 10 reps - 1 sets - 10 hold  · Seated Upper Thoracic Stretch - 2 x daily - 7 x weekly - 1 sets - 10 reps - 5 sec hold    Patient Education  · Office Posture

## 2022-07-11 ENCOUNTER — OFFICE VISIT (OUTPATIENT)
Dept: PHYSICAL THERAPY | Facility: CLINIC | Age: 82
End: 2022-07-11
Payer: COMMERCIAL

## 2022-07-11 DIAGNOSIS — M47.812 CERVICAL SPONDYLOSIS WITHOUT MYELOPATHY: ICD-10-CM

## 2022-07-11 DIAGNOSIS — R26.9 GAIT ABNORMALITY: ICD-10-CM

## 2022-07-11 DIAGNOSIS — M48.02 STENOSIS OF CERVICAL SPINE REGION: Primary | ICD-10-CM

## 2022-07-11 PROCEDURE — 97530 THERAPEUTIC ACTIVITIES: CPT

## 2022-07-11 PROCEDURE — 97110 THERAPEUTIC EXERCISES: CPT

## 2022-07-11 PROCEDURE — 97112 NEUROMUSCULAR REEDUCATION: CPT

## 2022-07-11 NOTE — PROGRESS NOTES
Daily Note     Today's date: 2022  Patient name: Radha Hernandez  : 1940  MRN: [id number]  Referring provider: Melecio Garg PA-C  Dx:   Encounter Diagnosis     ICD-10-CM    1  Stenosis of cervical spine region  M48 02    2  Gait abnormality  R26 9    3  Cervical spondylosis without myelopathy  M47 812        Start Time: 1035          Subjective: Patient reports feeling "sluggish" today and she does not know why  She feels like she has no other symptoms  Objective: See treatment diary below      Assessment: Tolerated treatment fair  Patient would benefit from continued PT for balance and strengthening  Patient was able to perform her exercises with little difficulty and no pain  She was a little more fatigued today and needed standing rests  She felt better by the end of the session  Plan: Continue per plan of care  Progress note during next visit  Progress treatment as tolerated           Precautions: RECENT C-SPINE FUSION C3-C6 ON 3/3/2022  RE:     Precautions: FALLS - USE BELT  [id number]     Specialty Daily Treatment Diary     Manual                                                 Ther Ex            Repeated flexion in sitting     x10 after walk   LAQ        Stand UBE ALT 90/70 120/30 x4 min 120/30 x4 min 120/30 x4 min     Bridges                T-spine/L-spine extensions Sit x10  Stand x10 Sit x10  Stand x10 Sit x10  Stand x10  Sit x10  Stand x10   Nu -step towel roll S= 9  L3 x8 min L3 x8 min     Neuro Re-Ed        Alt step taps XL 6" x15 1 H 6" x15 1 H 6" x15 1 H  6" x15 1 H   Foam Balance feet together EO                       EC 2x:30 ea EO 0H/EC 1H BLUE 2x:30 ea EO 0H/EC 1H BLUE 2x:30 ea EO 0H/EC 1H BLUE  2x:30 ea EO 0H/EC 1H BLUE   Towel roll education        Tandem Stand EO HT yes,no eyes fixed and not fixed :30x1 ea B/L HT yes,no eyes fixed and not fixed :30x1 ea B/L HT yes,no eyes fixed and not fixed :30x1 ea B/L 1H  * HT yes,no eyes fixed and not fixedNV   Core brace / belly pull in  Sit x10 Stand x10     kegels        multifidi        Blue foam step overs // bars x10 B/L x10 B/L x10 B/L  x10 B/L   CSMi weight shift (+) shanding 0H x1 min; weight shift  0H x10 ea np shanding 0H x1 min; weight shift  0H x 1 min ea  * " +" shift fwd/back and side/side x10 ea 0H   Seated bubble marches        Gait Training        Heel-toe amb x10 ft x4 10 ft x4 10ft x4  10ft x2   Hurdles Amb OBO        Shop walk        6 minute walk with cues 2 laps, rest, 2 laps, rest, 0 5 laps CGA w/ SPC 2 5 laps, rest 2 laps CGA w/ SPC np  5 5 laps 2 seated rests  (+) LOB with turn needed CGA to correct balance   SPC gait in gym   Done around obstacles  Done around obstacles AD   sidestepping        Cone weave SPC *25ft x2 25ft x3 25ft x4  *   Ther Activity        Chair Squats        Up/down steps SOS                        Chair squats with step taps        Step ups fwd/lat R x10 fwd/side 4" R x10 fwd/side 4" R x10 fwd/side 4"  R x10 fwd/side 4"       Modalities                                  Access Code: [email or web address]  URL: [email or web address]  Date: 06/27/2022  Prepared by: Zia Lozada    Exercises  · Seated Long Arc Quad - 3 x daily - 7 x weekly - 2 sets - 10 reps - 5 hold  · Seated Hamstring Stretch - 2 x daily - 7 x weekly - 1 sets - 4 reps - 15 hold  · Supine Transversus Abdominis Bracing - Hands on Stomach - 2 x daily - 7 x weekly - 10 reps - 2 sets - 5 hold  · Seated Pelvic Floor Contraction - 2 x daily - 7 x weekly - 10 reps - 1 sets - 10 hold  · Seated Upper Thoracic Stretch - 2 x daily - 7 x weekly - 1 sets - 10 reps - 5 sec hold    Patient Education  · Office Posture

## 2022-07-11 NOTE — PROGRESS NOTES
PT Re-Evaluation     Today's date: 2022  Patient name: Arlet Glover  : 1940  MRN: [id number]  Referring provider: Jennifer Tamayo PA-C  Dx:   Encounter Diagnosis     ICD-10-CM    1  Stenosis of cervical spine region  M48 02    2  Gait abnormality  R26 9    3  Cervical spondylosis without myelopathy  M47 812    4  S/P cervical spinal fusion  Z98 1                   Assessment  Assessment details: Arlet Glover is a 80 y o  female with a history of B/L TKA, CAD,dyslipidemia, HTN, SVT, type 2 DM, and BMI>30 that presents for a physical therapy RE evaluation  The patient has attended 9 sessions of skilled physical therapy  The patient demonstrates signs and symptoms consistent with cervical stenosis, gait abnormality, cervical spondylosis without myelopathy s/p ACDF C3-C6 on 3/3/22  During the examination the patient demonstrated improved but decreased postural/core/R LE strength, improved but decreased balance, gait abnormality, improved but impaired activity intolerance, and decreased R LE pain  The patient has made functional gains since starting therapy  The patient is now able to walk in the community longer distances with her SPC  The patient continues to have difficulty with walking on unlevel ground, balancing herself without UE support, and long distance walking  The patient will benefit from continued skilled PT to address impairments, work towards goals, and restore patient PLOF  Impairments: abnormal gait, abnormal or restricted ROM, activity intolerance, impaired physical strength, lacks appropriate home exercise program, pain with function and poor posture   Functional limitations: difficulty with prolonged standing, prolonged walking, walking on unlevel surfaces, difficulty squatting/kneeling, difficulty stair-climbing, and difficulty transferring from low surfaces    Symptom irritability: moderateBarriers to therapy: Chronicity of condition  Understanding of Dx/Px/POC: fair   Prognosis: fair  Prognosis details: AGE; MEDICAL HX    Goals  STG: Achieve in 4-6 weeks   1  Patient's R thigh pain at worst is no greater than 3/10 to improve activity tolerance  MET 7/13  2  Patient's cervical/thoracic improve to "minimal restriction" all motions tested to allow for function ROM with ADL's  PROGRESSING  3  Patient's R LE  MMT improve by 1/2-1 muscle grade to improve ambulation endurance for shopping/leisure  PROGRESSING  4  Patient's tandem stance balance improve to >20 seconds without LOB B/L LE to improve balance for gait  PROGRESSING  5  Timed up and Go score improve to less than 14 seconds to indicate decreased falls risk/improved balance  PROGRESSING    LTG: Achieve in 6-12 weeks  1  Patient tolerate walking in the community without R LE pain/ gait instability to achieve their personal therapy goal   PROGRESSING  2  Patient's strength at core/R LE [facility] to allow completion of leisure activities  PROGRESSING  3  Patient to be independent with home exercise plan at time of discharge  PROGRESSING  4  Patient's FOTO score improve to > 55% to indicate a return to normal function  MET   5  30 second sit to stand score improve by 3 stands (>10 total for age norm) to indicate improved transfers  PROGRESSING        Plan  Plan details: RE-ASSESS 1X/MONTH  Patient would benefit from: skilled physical therapy  Planned modality interventions: TENS, cryotherapy, thermotherapy: hydrocollator packs and ultrasound  Planned therapy interventions: manual therapy, neuromuscular re-education, patient education, postural training, self care, strengthening, stretching, therapeutic activities, therapeutic exercise, home exercise program, abdominal trunk stabilization, activity modification, balance and body mechanics training  Frequency: 1-3x/wk    Duration in weeks: 12  Plan of Care beginning date: 6/16/2022  Plan of Care expiration date: 9/15/2022  Treatment plan discussed with: PTA and patient        Subjective Evaluation    History of Present Illness  Date of surgery: 3/3/2022  Mechanism of injury: surgery  Mechanism of injury: SUBJECTIVE: 2022: The patent reports improvement with her gait since starting therapy  The patient reports improvement with walking at home - she does not use a cane  The patient continues to have difficulty with her balance and walking with unlevel surfaces  The patient will benefit from continued PT to achieve her goals of therapy          INJURY HISTORY: Arlet Glover is a 80 y o  female that presents to outpatient physical therapy with complaints of R leg pain and difficulty walking  The patient reports these symptoms have been present for the past 2-3 years  The patient recently received a multiple level anterior cervical fusion ( ACDF C3-C6) on 3/3/2022 done by Dr National Lawrence Elizabeth  The patient denies any post operative complications/issues  The patient had a F/U with the surgery team and the patient complained of persistent difficulty with walking/steadiness, and R LE strength  The patient notes difficulty with prolonged standing/walking/walking on unlevel surfaces/transferring up from a low seat  The patient was given a referral for C-spine stenosis and gait instability  The patient would like to focus on her gait  The patient's main goal for physical therapy is to " walk better"       Pain  Current pain ratin  At best pain rating: 3  At worst pain ratin  Location: R ant thigh to knee  Quality: dull ache  Relieving factors: medications  Progression: no change    Social Support  Steps to enter house: yes  1  Stairs in house: yes   2  L[facility] in: [facility]  Lives with: alone    Hand dominance: right    Treatments  Current treatment: physical therapy  Patient Goals  Patient goals for therapy: decreased pain, increased motion, increased strength, independence with ADLs/IADLs, return to sport/leisure activities and improved balance  Patient goal: walk better        Objective     Concurrent Complaints  Negative for night pain, disturbed sleep, dizziness, faints, headaches, nausea/motion sickness, tinnitus, trouble swallowing, difficulty breathing, shortness of breath, respiratory pain, visual change, history of cancer, history of trauma and infection    Static Posture     Head  Forward  Shoulders  Rounded  Thoracic Spine  Hyperkyphosis  Lumbar Spine   Flattened  Lumbar spine (Left): Shifted  Pelvis   Posterior pelvic tilt    Palpation     Additional Palpation Details  NO TTP    Neurological Testing     Sensation   Cervical/Thoracic   Left   Intact: light touch  Paresthesia: light touch    Right   Intact: light touch  Paresthesia: light touch    Comments   Left light touch: fingertips  Right light touch: fingertips       Reflexes   Left   Burnett's reflex: negative    Right   Burnett's reflex: negative    Active Range of Motion   Cervical/Thoracic Spine       Cervical    Flexion:  Restriction level: minimal  Extension:  Restriction level: minimal  Left lateral flexion:  Restriction level: minimal  Right lateral flexion:  Restriction level minimal  Left rotation:  Restriction level: minimal  Right rotation:  Restriction level: minimal    Thoracic    Flexion:  Restriction level: minimal  Extension:  Restriction level: moderate    Additional Active Range of Motion Details  IMPROVED AT C-SPINE    Strength/Myotome Testing   Cervical Spine     Left   Interossei strength (t1): 4    Right   Interossei strength (t1): 4    Left Shoulder     Planes of Motion   Flexion: 4+   External rotation at 0°: 4     Right Shoulder     Planes of Motion   Flexion: 4+   External rotation at 0°: 4     Left Elbow   Flexion: 5  Extension: 5    Right Elbow   Flexion: 5  Extension: 5    Left Wrist/Hand   Wrist extension: 5  Wrist flexion: 5  Thumb extension: 4    Right Wrist/Hand   Wrist extension: 5  Wrist flexion: 5  Thumb extension: 4    Left Hip   Planes of Motion Flexion: 4  Extension: 4  Abduction: 4  Adduction: 4    Right Hip   Planes of Motion   Flexion: 3+  Extension: 4-  Abduction: 4-  Adduction: 4-    Left Knee   Flexion: 4  Extension: 4    Right Knee   Flexion: 3+  Extension: 4-    Left Ankle/Foot   Dorsiflexion: 5  Plantar flexion: 5    Right Ankle/Foot   Dorsiflexion: 4+  Plantar flexion: 4+    Muscle Activation     Additional Muscle Activation Details  Improved but Decreased TrA, multifidus, gluteal activation with position changes      Tests     Additional Tests Details  FOTO: 60% ( predicted 53%) ( improved     30SSTS: 7 stands used B/L hands ( 10 is age norm) NO CHANGE    Gait impairments: Patient ambulates with SPC WBAT B/L LE  The patient demonstrates improved but still slow gait speed, unequal step lengths, wide TRI,  R lateral trunk lean,  and Improved but impaired heel-toe rollover R LE      Gait without SPC: Improved posture, unequal step lengths, slow gait speed    TU seconds w/ SPC ( improved 2 seconds)    Tandem stand:L: L: 18 seconds  R: 30 seconds  Tandem gait: patient needs B/L UE but able to attempt  Neuro Exam:     Headaches   Patient reports headaches: No                 Precautions: RECENT C-SPINE FUSION C3-C6 ON 3/3/2022  RE:     Precautions: FALLS - USE BELT  [id number]     Specialty Daily Treatment Diary     Manual   630                                               Ther Ex  630           Repeated flexion in sitting     x10 after walk   LAQ        Stand UBE ALT 90/70 120/30 x4 min 120/30 x4 min 120/30 x4 min 120/70 x 4 mins stand    Bridges                T-spine/L-spine extensions Sit x10  Stand x10 Sit x10  Stand x10 Sit x10  Stand x10  Sit x10  Stand x10   Nu -step towel roll S= 9  L3 x8 min L3 x8 min L3 x 8 mins    Neuro Re-Ed        Alt step taps XL 6" x15 1 H 6" x15 1 H 6" x15 1 H  6" x15 1 H   Foam Balance feet together EO                       EC 2x:30 ea EO 0H/EC 1H BLUE 2x:30 ea EO 0H/EC 1H BLUE 2x:30 ea EO 0H/EC 1H BLUE 2x:30 EO 0H / EC 1H 2x:30 2x:30 ea EO 0H/EC 1H BLUE   Towel roll education        Tandem Stand EO HT yes,no eyes fixed and not fixed :30x1 ea B/L HT yes,no eyes fixed and not fixed :30x1 ea B/L HT yes,no eyes fixed and not fixed :30x1 ea B/L 1H :30x1 EO * HT yes,no eyes fixed and not fixedNV   Core brace / belly pull in  Sit x10 Stand x10     kegels        multifidi        Blue foam step overs // bars x10 B/L x10 B/L x10 B/L NV x10 B/L   CSMi weight shift (+) shanding 0H x1 min; weight shift  0H x10 ea np shanding 0H x1 min; weight shift  0H x 1 min ea XY  Weight shift fwd/side 0H x1'30" ea  x10 squats 2 H * " +" shift fwd/back and side/side x10 ea 0H   Seated bubble marches        Gait Training        Heel-toe amb x10 ft x4 10 ft x4 10ft x4 10ft x 4 counter 10ft x2   Hurdles Amb OBO        Shop walk        6 minute walk with cues 2 laps, rest, 2 laps, rest, 0 5 laps CGA w/ SPC 2 5 laps, rest 2 laps CGA w/ SPC np NV 5 5 laps 2 seated rests  (+) LOB with turn needed CGA to correct balance   SPC gait in gym   Done around obstacles  Done around obstacles AD   sidestepping        Cone weave SPC *25ft x2 25ft x3 25ft x4 25ft x 4    Ther Activity        Chair Squats        Up/down steps SOS                        Chair squats with step taps        Step ups fwd/lat R x10 fwd/side 4" R x10 fwd/side 4" R x10 fwd/side 4" x10 R x10 fwd/side 4"

## 2022-07-13 ENCOUNTER — EVALUATION (OUTPATIENT)
Dept: PHYSICAL THERAPY | Facility: CLINIC | Age: 82
End: 2022-07-13
Payer: COMMERCIAL

## 2022-07-13 DIAGNOSIS — Z98.1 S/P CERVICAL SPINAL FUSION: ICD-10-CM

## 2022-07-13 DIAGNOSIS — M48.02 STENOSIS OF CERVICAL SPINE REGION: Primary | ICD-10-CM

## 2022-07-13 DIAGNOSIS — R26.9 GAIT ABNORMALITY: ICD-10-CM

## 2022-07-13 DIAGNOSIS — M47.812 CERVICAL SPONDYLOSIS WITHOUT MYELOPATHY: ICD-10-CM

## 2022-07-13 PROCEDURE — 97112 NEUROMUSCULAR REEDUCATION: CPT | Performed by: PHYSICAL THERAPIST

## 2022-07-13 PROCEDURE — 97110 THERAPEUTIC EXERCISES: CPT | Performed by: PHYSICAL THERAPIST

## 2022-07-13 PROCEDURE — 97116 GAIT TRAINING THERAPY: CPT | Performed by: PHYSICAL THERAPIST

## 2022-07-20 ENCOUNTER — OFFICE VISIT (OUTPATIENT)
Dept: PHYSICAL THERAPY | Facility: CLINIC | Age: 82
End: 2022-07-20
Payer: COMMERCIAL

## 2022-07-20 DIAGNOSIS — M47.812 CERVICAL SPONDYLOSIS WITHOUT MYELOPATHY: ICD-10-CM

## 2022-07-20 DIAGNOSIS — R26.9 GAIT ABNORMALITY: ICD-10-CM

## 2022-07-20 DIAGNOSIS — M48.02 STENOSIS OF CERVICAL SPINE REGION: Primary | ICD-10-CM

## 2022-07-20 PROCEDURE — 97110 THERAPEUTIC EXERCISES: CPT

## 2022-07-20 PROCEDURE — 97112 NEUROMUSCULAR REEDUCATION: CPT

## 2022-07-20 PROCEDURE — 97116 GAIT TRAINING THERAPY: CPT

## 2022-07-20 NOTE — PROGRESS NOTES
Daily Note     Today's date: 2022  Patient name: Merlinda Stabler  : 1940  MRN: [id number]  Referring provider: Kalie Rodríguez PA-C  Dx:   Encounter Diagnosis     ICD-10-CM    1  Stenosis of cervical spine region  M48 02    2  Gait abnormality  R26 9    3  Cervical spondylosis without myelopathy  M47 812        Start Time: 1038          Subjective: Patient states she has no pain today and she is only a "little wobbly" today  She has noticed she is now able to walk around the house without her cane  Her legs feel stronger and she has better balance  She does still reach for furniture when walking in home as "a security blanket"  Objective: See treatment diary below      Assessment: Tolerated treatment well  Patient would benefit from continued PT for balance and strengthening  Patient was able to increase her walking today  Patient contiunes to be challnged with standing activities  She has a tendency to walk with slight forward flexed posture and has low floor clearance during swing through  She has a tendency to scuff her right foot to take a quick step to off set weight on left foot  Patient aware and will work on correcting it when walking at home  Patient felt tired when she left department  Plan: Continue per plan of care  Progress treatment as tolerated           Precautions: RECENT C-SPINE FUSION C3-C6 ON 3/3/2022  RE:     Precautions: FALLS - USE BELT  [id number]     Specialty Daily Treatment Diary     Manual                                                  Ther Ex             Repeated flexion in sitting        LAQ        Stand UBE ALT 90/70  120/30 x4 min 120/30 x4 min 120/70 x 4 mins stand 120/70 x 4 mins stand   Bridges                T-spine/L-spine extensions  Sit x10  Stand x10 Sit x10  Stand x10  Sit x10  Stand x10   Nu -step towel roll S= 9  L3 x8 min L3 x8 min L3 x 8 mins L3 x6 min   Neuro Re-Ed        Alt step taps XL  6" x15 1 H 6" x15 1 H     Foam Balance feet together EO                       EC  2x:30 ea EO 0H/EC 1H BLUE 2x:30 ea EO 0H/EC 1H BLUE 2x:30 EO 0H / EC 1H 2x:30 2x:30 ea EO 0-1H HT/EC 1H BLUE   Towel roll education        Tandem Stand EO  HT yes,no eyes fixed and not fixed :30x1 ea B/L HT yes,no eyes fixed and not fixed :30x1 ea B/L 1H :30x1 EO EO HT/EC :30x1 ea B/L   Core brace / belly pull in  Sit x10 Stand x10     kegels        multifidi        Blue foam step overs // bars  x10 B/L x10 B/L NV x10 ea   CSMi weight shift  np shanding 0H x1 min; weight shift  0H x 1 min ea XY  Weight shift fwd/side 0H x1'30" ea  x10 squats 2 H np   Seated bubble [employer]        Gait Training        Heel-toe amb  10 ft x4 10ft x4 10ft x 4 counter 10ft x4   Hurdles Amb OBO        Shop walk        6 minute walk with cues  2 5 laps, rest 2 laps CGA w/ SPC np NV 3 laps, rest, 55 ft close S w/ cane   SPC gait in gym   Done around obstacles     sidestepping        Cone weave SPC  25ft x3 25ft x4 25ft x 4 25ft x4   Ther Activity        Chair Squats        Up/down steps SOS                        Chair squats with step taps        Step ups fwd/lat ^ R x10 fwd/side 4" R x10 fwd/side 4" x10 B/L x10 fwd/side 4"

## 2022-07-22 ENCOUNTER — OFFICE VISIT (OUTPATIENT)
Dept: PHYSICAL THERAPY | Facility: CLINIC | Age: 82
End: 2022-07-22
Payer: COMMERCIAL

## 2022-07-22 DIAGNOSIS — R26.9 GAIT ABNORMALITY: ICD-10-CM

## 2022-07-22 DIAGNOSIS — M47.812 CERVICAL SPONDYLOSIS WITHOUT MYELOPATHY: ICD-10-CM

## 2022-07-22 DIAGNOSIS — M48.02 STENOSIS OF CERVICAL SPINE REGION: Primary | ICD-10-CM

## 2022-07-22 PROCEDURE — 97530 THERAPEUTIC ACTIVITIES: CPT

## 2022-07-22 PROCEDURE — 97110 THERAPEUTIC EXERCISES: CPT

## 2022-07-22 PROCEDURE — 97112 NEUROMUSCULAR REEDUCATION: CPT

## 2022-07-22 NOTE — PROGRESS NOTES
Daily Note     Today's date: 2022  Patient name: Sheree Marsh  : 1940  MRN: [id number]  Referring provider: Emily Mcghee PA-C  Dx:   Encounter Diagnosis     ICD-10-CM    1  Stenosis of cervical spine region  M48 02    2  Gait abnormality  R26 9    3  Cervical spondylosis without myelopathy  M47 812        Start Time: 1000          Subjective: Patient states she has no pain and she feels pretty good  Objective: See treatment diary below      Assessment: Tolerated treatment fair+  Patient would benefit from continued PT for balance and strengthening  Patient performed her exercises with little difficulty and no pain  She has a tendency to loose her balance if she turns the head to quick  Patient felt tired by the end of the session  Plan: Continue per plan of care  Progress treatment as tolerated           Precautions: RECENT C-SPINE FUSION C3-C6 ON 3/3/2022  RE:     Precautions: FALLS - USE BELT  [id number]     Specialty Daily Treatment Diary     Manual                                                 Ther Ex            Repeated flexion in sitting        LAQ        Stand UBE ALT 90/70 120/70 x 4 mins stand  120/30 x4 min 120/70 x 4 mins stand 120/70 x 4 mins stand   Bridges                T-spine/L-spine extensions Sit x10  Stand x10  Sit x10  Stand x10  Sit x10  Stand x10   Nu -step towel roll S= 9 L3  x9 min  L3 x8 min L3 x 8 mins L3 x6 min   Neuro Re-Ed        Alt step taps XL   6" x15 1 H     Foam Balance feet together EO                       EC 2x:30 ea EO 0-1H HT/EC 1H BLUE  2x:30 ea EO 0H/EC 1H BLUE 2x:30 EO 0H / EC 1H 2x:30 2x:30 ea EO 0-1H HT/EC 1H BLUE   Towel roll education        Tandem Stand EO   HT yes,no eyes fixed and not fixed :30x1 ea B/L 1H :30x1 EO EO HT/EC :30x1 ea B/L   Core brace / belly pull in   Stand x10     kegels        multifidi        Blue foam step overs // bars x10 B/L  x10 B/L NV x10 ea   CSMi weight shift shanding 0H x1 min; weight shift  0H x 1 min ea XY  Weight shift fwd/side 0H x1'30" ea  x10 squats 2 H np   Seated bubble marches        Gait Training        Heel-toe amb 10ft x4  10ft x4 10ft x 4 counter 10ft x4   Hurdles Amb OBO        Shop walk        6 minute walk with cues 4 laps, rest, 1 5 laps w/ cane  np NV 3 laps, rest, 55 ft close S w/ cane   SPC gait in gym   Done around obstacles     sidestepping        Cone weave SPC 25ft x4  25ft x4 25ft x 4 25ft x4   Ther Activity        Chair Squats        Up/down steps SOS                        Chair squats with step taps        Step ups fwd/lat  ^ R x10 fwd/side 4" x10 B/L x10 fwd/side 4"

## 2022-07-25 ENCOUNTER — OFFICE VISIT (OUTPATIENT)
Dept: PHYSICAL THERAPY | Facility: CLINIC | Age: 82
End: 2022-07-25
Payer: COMMERCIAL

## 2022-07-25 DIAGNOSIS — M48.02 STENOSIS OF CERVICAL SPINE REGION: Primary | ICD-10-CM

## 2022-07-25 DIAGNOSIS — R26.9 GAIT ABNORMALITY: ICD-10-CM

## 2022-07-25 DIAGNOSIS — M47.812 CERVICAL SPONDYLOSIS WITHOUT MYELOPATHY: ICD-10-CM

## 2022-07-25 PROCEDURE — 97110 THERAPEUTIC EXERCISES: CPT

## 2022-07-25 PROCEDURE — 97112 NEUROMUSCULAR REEDUCATION: CPT

## 2022-07-25 PROCEDURE — 97530 THERAPEUTIC ACTIVITIES: CPT

## 2022-07-25 NOTE — PROGRESS NOTES
Daily Note     Today's date: 2022  Patient name: Tracee Garcia  : 1940  MRN: [id number]  Referring provider: Bronson Russell PA-C  Dx:   Encounter Diagnosis     ICD-10-CM    1  Stenosis of cervical spine region  M48 02    2  Gait abnormality  R26 9    3  Cervical spondylosis without myelopathy  M47 812        Start Time: 1024          Subjective: Patient states she has no pain, just her usual soreness in the right thigh  Objective: See treatment diary below      Assessment: Tolerated treatment well  Patient would benefit from continued PT for strengthening  Patient was able to increase some of her exercises  With little difficulty and no increase of pain  Her knees bothered her more today which was bothering her balance more today  She has a tendency to shuffle her feet  With walking  This is correctable with verbal cues, but it returns quickly because she forgets  Patient felt tired when she left department  Plan: Continue per plan of care  Progress treatment as tolerated           Precautions: RECENT C-SPINE FUSION C3-C6 ON 3/3/2022  RE:     Precautions: FALLS - USE BELT  [id number]     Specialty Daily Treatment Diary     Manual                                                 Ther Ex            Repeated flexion in sitting        LAQ        Stand UBE ALT 90/70 120/70 x 4 mins stand 120/70 x 4 mins stand  120/70 x 4 mins stand 120/70 x 4 mins stand   Bridges                T-spine/L-spine extensions Sit x10  Stand x10 Sit x10  Stand x10   Sit x10  Stand x10   Nu -step towel roll S= 9 L3  x9 min L3 x10 min  L3 x 8 mins L3 x6 min   Neuro Re-Ed        Alt step taps XL        Foam Balance feet together EO                       EC 2x:30 ea EO 0-1H HT/EC 1H BLUE 2x:30 ea EO 0-1H HT/EC 1H BLUE  2x:30 EO 0H / EC 1H 2x:30 2x:30 ea EO 0-1H HT/EC 1H BLUE   Towel roll education        Tandem Stand EO  EO HT/EC :30x1 ea B/L  :30x1 EO EO HT/EC :30x1 ea B/L Core brace / belly pull in        kegels        multifidi        Blue foam step overs // bars x10 B/L x10 B/L  NV x10 ea   CSMi weight shift  XY  Weight shift fwd/side 0H x1'30" ea  x10 squats 1 H  XY  Weight shift fwd/side 0H x1'30" ea  x10 squats 2 H np   Seated bubble marches        Gait Training        Heel-toe amb 10ft x4 10ft x4  10ft x 4 counter 10ft x4   Hurdles Amb OBO        Shop walk        6 minute walk with cues 4 laps, rest, 1 5 laps w/ cane declined  NV 3 laps, rest, 55 ft close S w/ cane   SPC gait in gym        sidestepping        Cone weave SPC 25ft x4 25ft x2  25ft x 4 25ft x4   Ther Activity        Chair Squats        Up/down steps SOS                        Chair squats with step taps        Step ups fwd/lat  B/L x10 fwd/side 6"  x10 B/L x10 fwd/side 4"

## 2022-07-27 ENCOUNTER — OFFICE VISIT (OUTPATIENT)
Dept: PHYSICAL THERAPY | Facility: CLINIC | Age: 82
End: 2022-07-27
Payer: COMMERCIAL

## 2022-07-27 DIAGNOSIS — R26.9 GAIT ABNORMALITY: ICD-10-CM

## 2022-07-27 DIAGNOSIS — M48.02 STENOSIS OF CERVICAL SPINE REGION: Primary | ICD-10-CM

## 2022-07-27 DIAGNOSIS — M47.812 CERVICAL SPONDYLOSIS WITHOUT MYELOPATHY: ICD-10-CM

## 2022-07-27 PROCEDURE — 97530 THERAPEUTIC ACTIVITIES: CPT

## 2022-07-27 PROCEDURE — 97112 NEUROMUSCULAR REEDUCATION: CPT

## 2022-07-27 PROCEDURE — 97110 THERAPEUTIC EXERCISES: CPT

## 2022-07-27 NOTE — PROGRESS NOTES
Daily Note     Today's date: 2022  Patient name: Sade Nuñez  : 1940  MRN: [id number]  Referring provider: Garret Augustine PA-C  Dx:   Encounter Diagnosis     ICD-10-CM    1  Stenosis of cervical spine region  M48 02    2  Gait abnormality  R26 9    3  Cervical spondylosis without myelopathy  M47 812        Start Time: 1032          Subjective: Patient states she has her usual pain  She feels ok today  She has not had any falls recently  Her left knee feels weak at times, but nothing else  She noticed she now can come down the stairs leading with her left  Objective: See treatment diary below      Assessment: Tolerated treatment well  Patient would benefit from continued PT for stretching and strengthening  Patient was able to perform her exercises with little difficulty and no increase of pain  Patient felt tired by the end of the session  Plan: Continue per plan of care  Progress treatment as tolerated           Precautions: RECENT C-SPINE FUSION C3-C6 ON 3/3/2022  RE:     Precautions: FALLS - USE BELT  [id number]     Specialty Daily Treatment Diary     Manual                                                 Ther Ex            Repeated flexion in sitting        LAQ        Stand UBE ALT 90/70 120/70 x 4 mins stand 120/70 x 4 mins stand 120/70 x 4 mins stand  120/70 x 4 mins stand   Bridges                T-spine/L-spine extensions Sit x10  Stand x10 Sit x10  Stand x10 Sit x10  Stand x10  Sit x10  Stand x10   Nu -step towel roll S= 9 L3  x9 min L3 x10 min L3 x10 min  L3 x6 min   Neuro Re-Ed        Alt step taps XL        Foam Balance feet together EO                       EC 2x:30 ea EO 0-1H HT/EC 1H BLUE 2x:30 ea EO 0-1H HT/EC 1H BLUE   2x:30 ea EO 0-1H HT/EC 1H BLUE   Towel roll education        Tandem Stand EO  EO HT/EC :30x1 ea B/L Foam  EO/EC :30x1 ea B/L  EO HT/EC :30x1 ea B/L   Core brace / belly pull in        kegels        multifidi Blue foam step overs // bars x10 B/L x10 B/L x10 B/L  x10 ea   CSMi weight shift  XY  Weight shift fwd/side 0H x1'30" ea  x10 squats 1 H   np   Seated bubble marches        Gait Training        Heel-toe amb 10ft x4 10ft x4 10 ft x4  10ft x4   Hurdles Amb OBO        Shop walk        6 minute walk with cues 4 laps, rest, 1 5 laps w/ cane declined 4 laps, rest, 1 5 laps w/ cane  3 laps, rest, 55 ft close S w/ cane   SPC gait in gym        sidestepping        Cone weave SPC 25ft x4 25ft x2 25ft x2  25ft x4   Ther Activity        Chair Squats        Up/down steps SOS                        Chair squats with step taps        Step ups fwd/lat  B/L x10 fwd/side 6" B/L x10 fwd/side 6"  B/L x10 fwd/side 4"

## 2022-08-01 ENCOUNTER — OFFICE VISIT (OUTPATIENT)
Dept: PHYSICAL THERAPY | Facility: CLINIC | Age: 82
End: 2022-08-01
Payer: COMMERCIAL

## 2022-08-01 DIAGNOSIS — R26.9 GAIT ABNORMALITY: ICD-10-CM

## 2022-08-01 DIAGNOSIS — M47.812 CERVICAL SPONDYLOSIS WITHOUT MYELOPATHY: ICD-10-CM

## 2022-08-01 DIAGNOSIS — M48.02 STENOSIS OF CERVICAL SPINE REGION: Primary | ICD-10-CM

## 2022-08-01 PROCEDURE — 97530 THERAPEUTIC ACTIVITIES: CPT

## 2022-08-01 PROCEDURE — 97116 GAIT TRAINING THERAPY: CPT

## 2022-08-01 PROCEDURE — 97110 THERAPEUTIC EXERCISES: CPT

## 2022-08-01 PROCEDURE — 97112 NEUROMUSCULAR REEDUCATION: CPT

## 2022-08-01 NOTE — PROGRESS NOTES
Daily Note     Today's date: 2022  Patient name: Radha Hernandez  : 1940  MRN: [id number]  Referring provider: Melecio Garg PA-C  Dx:   Encounter Diagnosis     ICD-10-CM    1  Stenosis of cervical spine region  M48 02    2  Gait abnormality  R26 9    3  Cervical spondylosis without myelopathy  M47 812        Start Time: 1027          Subjective: Patient states she feels ok today  She states her daughter noticed, at [employer] yesterday, that her walking has improved  Objective: See treatment diary below      Assessment: Tolerated treatment well  Patient would benefit from continued PT for balance and strengthening  Patient needed verbal cues to perform some of the exercises correctly  She wanted to do more, but her walking became slower and her foot height with swing through decreased as session went on  She felt tired but "good" by the end of the session  Plan: Continue per plan of care  Progress treatment as tolerated           Precautions: RECENT C-SPINE FUSION C3-C6 ON 3/3/2022  RE:     Precautions: FALLS - USE BELT  [id number]     Specialty Daily Treatment Diary     Manual                                                  Ther Ex  8            Repeated flexion in sitting        LAQ        Stand UBE ALT 90/70 120/70 x 4 mins stand 120/70 x 4 mins stand 120/70 x 4 mins stand 120/70 x 4 mins stand    Bridges                T-spine/L-spine extensions Sit x10  Stand x10 Sit x10  Stand x10 Sit x10  Stand x10 Sit x10  Stand x10    Nu -step towel roll S= 9 L3  x9 min L3 x10 min L3 x10 min L3 x10 min    Neuro Re-Ed        Alt step taps XL        Foam Balance feet together EO                       EC 2x:30 ea EO 0-1H HT/EC 1H BLUE 2x:30 ea EO 0-1H HT/EC 1H BLUE      Towel roll education        Tandem Stand EO  EO HT/EC :30x1 ea B/L  Foam EO/EC  :30x1 ea    Core brace / belly pull in        kegels        multifidi        Blue foam step overs // bars x10 B/L x10 B/L x10 B/L x10 B/L    CSMi weight shift  XY  Weight shift fwd/side 0H x1'30" ea  x10 squats 1 H  XY  Weight shift fwd/side 0H x1'30" ea  x10 squats 1 H    Seated bubble [email or web address]        Gait Training        Heel-toe amb 10ft x4 10ft x4 10 ft x4 10ft x4    Hurdles Amb OBO        Shop walk        6 minute walk with cues 4 laps, rest, 1 5 laps w/ cane declined 4 laps, rest, 1 5 laps w/ cane 4 laps, rest, 2 laps w/ cane    SPC gait in gym        sidestepping        Cone weave SPC 25ft x4 25ft x2 25ft x2 25ft x2    Ther Activity        Chair Squats        Up/down steps SOS                        Chair squats with step taps        Step ups fwd/lat  B/L x10 fwd/side 6" B/L x10 fwd/side 6" B/L x10 fwd/side 4" (6"n/a)

## 2022-08-03 ENCOUNTER — OFFICE VISIT (OUTPATIENT)
Dept: PHYSICAL THERAPY | Facility: CLINIC | Age: 82
End: 2022-08-03
Payer: COMMERCIAL

## 2022-08-03 DIAGNOSIS — M47.812 CERVICAL SPONDYLOSIS WITHOUT MYELOPATHY: ICD-10-CM

## 2022-08-03 DIAGNOSIS — R26.9 GAIT ABNORMALITY: ICD-10-CM

## 2022-08-03 DIAGNOSIS — M48.02 STENOSIS OF CERVICAL SPINE REGION: Primary | ICD-10-CM

## 2022-08-03 PROCEDURE — 97112 NEUROMUSCULAR REEDUCATION: CPT

## 2022-08-03 PROCEDURE — 97116 GAIT TRAINING THERAPY: CPT

## 2022-08-03 PROCEDURE — 97530 THERAPEUTIC ACTIVITIES: CPT

## 2022-08-03 PROCEDURE — 97110 THERAPEUTIC EXERCISES: CPT

## 2022-08-03 NOTE — PROGRESS NOTES
Daily Note     Today's date: 8/3/2022  Patient name: Aleksandra Martinez  : 1940  MRN: [id number]  Referring provider: Kortney Chiu PA-C  Dx:   Encounter Diagnosis     ICD-10-CM    1  Stenosis of cervical spine region  M48 02    2  Gait abnormality  R26 9    3  Cervical spondylosis without myelopathy  M47 812        Start Time: 1033          Subjective: Patient states she feels good today  Objective: See treatment diary below      Assessment: Tolerated treatment well  Patient would benefit from continued PT for balance and strengthening  Patient was able to add walking balance exercises with less support  She felt tired when she left department  Plan: Continue per plan of care  Progress treatment as tolerated           Precautions: RECENT C-SPINE FUSION C3-C6 ON 3/3/2022  RE:     Precautions: FALLS - USE BELT  [id number]     Specialty Daily Treatment Diary     Manual  7/22 7/25 7/27 8/1 8/3                                               Ther Ex 7/22 7/25 7/27 8/1 8/3           Repeated flexion in sitting        LAQ        Stand UBE ALT 90/70 120/70 x 4 mins stand 120/70 x 4 mins stand 120/70 x 4 mins stand 120/70 x 4 mins stand 120/70 x 4 mins stand   Bridges                T-spine/L-spine extensions Sit x10  Stand x10 Sit x10  Stand x10 Sit x10  Stand x10 Sit x10  Stand x10 Sit x10  Stand x10   Nu -step towel roll S= 9 L3  x9 min L3 x10 min L3 x10 min L3 x10 min L3 x10 min   Neuro Re-Ed        Alt step taps XL        Foam Balance feet together EO                       EC 2x:30 ea EO 0-1H HT/EC 1H BLUE 2x:30 ea EO 0-1H HT/EC 1H BLUE      Towel roll education        Tandem Stand EO  EO HT/EC :30x1 ea B/L  Foam EO/EC  :30x1 ea Foam EO/EC  :30x1 ea   Core brace / belly pull in        kegels        multifidi        Blue foam step overs // bars x10 B/L x10 B/L x10 B/L x10 B/L x10 B/L   CSMi weight shift  XY  Weight shift fwd/side 0H x1'30" ea  x10 squats 1 H  XY  Weight shift fwd/side 0H x1'30" ea  x10 squats 1 H XY  Weight shift fwd/side 0H x2' ea  x10 squats 1 H   Seated bubble marches        Gait Training        Heel-toe amb 10ft x4 10ft x4 10 ft x4 10ft x4 10ft x4   Hurdles Amb OBO     // bars 1H   10ft x4   Shop walk     // 10 ftv x2 ea 0H   6 minute walk with cues 4 laps, rest, 1 5 laps w/ cane declined 4 laps, rest, 1 5 laps w/ cane 4 laps, rest, 2 laps w/ cane Encouraged to do later today at home     SPC gait in gym        sidestepping        Cone weave SPC 25ft x4 25ft x2 25ft x2 25ft x2 25ft x2   Ther Activity        Chair Squats        Up/down steps SOS                        Chair squats with step taps        Step ups fwd/lat  B/L x10 fwd/side 6" B/L x10 fwd/side 6" B/L x10 fwd/side 4" (6"n/a) B/L x10 fwd/side 6"

## 2022-08-08 ENCOUNTER — OFFICE VISIT (OUTPATIENT)
Dept: PHYSICAL THERAPY | Facility: CLINIC | Age: 82
End: 2022-08-08
Payer: COMMERCIAL

## 2022-08-08 DIAGNOSIS — M48.02 STENOSIS OF CERVICAL SPINE REGION: Primary | ICD-10-CM

## 2022-08-08 DIAGNOSIS — M47.812 CERVICAL SPONDYLOSIS WITHOUT MYELOPATHY: ICD-10-CM

## 2022-08-08 DIAGNOSIS — R26.9 GAIT ABNORMALITY: ICD-10-CM

## 2022-08-08 PROCEDURE — 97530 THERAPEUTIC ACTIVITIES: CPT

## 2022-08-08 PROCEDURE — 97116 GAIT TRAINING THERAPY: CPT

## 2022-08-08 PROCEDURE — 97110 THERAPEUTIC EXERCISES: CPT

## 2022-08-08 PROCEDURE — 97112 NEUROMUSCULAR REEDUCATION: CPT

## 2022-08-08 NOTE — PROGRESS NOTES
Daily Note     Today's date: 2022  Patient name: Mason Casas  : 1940  MRN: [id number]  Referring provider: Becka Nelson PA-C  Dx:   Encounter Diagnosis     ICD-10-CM    1  Stenosis of cervical spine region  M48 02    2  Gait abnormality  R26 9    3  Cervical spondylosis without myelopathy  M47 812        Start Time: 1032          Subjective: Patient states she feels ok today  She is now able to walk down her hallway in the home without holding onto the walls  Objective: See treatment diary below    Patient educated on picking up feet more during ambulation  She continues to shuffle them or have low floor clearance during swing through  She was educated how the shuffling changes her balance and makes her a risk for falls  Patient given home exerises of picking up her feet with march at sink and sway to music for weight shifting exercises at home  She is to work on decreasing shoulder sway with weight shifts  Encouraged patient to work on goals to do more activities outside home to return to prior status socially  Assessment: Tolerated treatment well  Patient would benefit from continued PT for balance and strengthening  Patient was able to increase some of her exercises  She felt a little more challenged with the changes  She knows the therapy has been helping her  Plan: Continue per plan of care  Progress note during next visit  Progress treatment as tolerated           Precautions: RECENT C-SPINE FUSION C3-C6 ON 3/3/2022  RE:     Precautions: FALLS - USE BELT  [id number]     Specialty Daily Treatment Diary     Manual  8/8  7/27 8/1 8/3                                               Ther Ex 8/8  7/27 8/1 8/3           Repeated flexion in sitting        LAQ        Stand UBE ALT 90/70 90/70 x4 min stand  120/70 x 4 mins stand 120/70 x 4 mins stand 120/70 x 4 mins stand   Bridges                T-spine/L-spine extensions Sit x10  Stand x10  Sit x10  Stand x10 Sit x10  Stand x10 Sit x10  Stand x10   Nu -step towel roll S= 9 L3 x10 min  L3 x10 min L3 x10 min L3 x10 min   Neuro Re-Ed        Alt step taps XL        Foam Balance feet together EO                       EC        Towel roll education        Tandem Stand EO Foam EO/EC  :30x1 ea 1H   Foam EO/EC  :30x1 ea Foam EO/EC  :30x1 ea   Core brace / belly pull in        kegels        multifidi        Blue foam step overs // bars x10 B/L  x10 B/L x10 B/L x10 B/L   CSMi weight shift XY  Weight shift fwd/side 0H x2' ea  x10 squats 1 H   XY  Weight shift fwd/side 0H x1'30" ea  x10 squats 1 H XY  Weight shift fwd/side 0H x2' ea  x10 squats 1 H   Seated bubble [email or web address]        Gait Training        Heel-toe amb // bars 10ft x4  10 ft x4 10ft x4 10ft x4   Hurdles Amb OBO // bars 1H   10ft x4    // bars 1H   10ft x4   Shop walk // 10ft x4 ea 0H    // 10 ft x2 ea 0H   6 minute walk with cues Encouraged to do later today at home  4 laps, rest, 1 5 laps w/ cane 4 laps, rest, 2 laps w/ cane Encouraged to do later today at home     SPC gait in gym        Backwards walk *// bars 10ft x4       Cone weave SPC 25ft x4  25ft x2 25ft x2 25ft x2   Ther Activity        Chair Squats        Up/down steps SOS                        Chair squats with step taps        Step ups fwd/lat B/L x10 ea  Fwd 8"  Side 6"    B/L x10 fwd/side 6" B/L x10 fwd/side 4" (6"n/a) B/L x10 fwd/side 6"

## 2022-08-09 NOTE — PROGRESS NOTES
PT Re-Evaluation  and PT Discharge    Today's date: 8/10/2022  Patient name: Kinza Shaver  : 1940  MRN: [id number]  Referring provider: Tyrell Mendoza PA-C  Dx:   Encounter Diagnosis     ICD-10-CM    1  Stenosis of cervical spine region  M48 02    2  Gait abnormality  R26 9    3  Cervical spondylosis without myelopathy  M47 812    4  S/P cervical spinal fusion  Z98 1                   Assessment  Assessment details: Kinza Shaver is a 80 y o  female with a history of B/L TKA, CAD,dyslipidemia, HTN, SVT, type 2 DM, and BMI>30 that presents for a physical therapy RE evaluation  The patient has attended 17 sessions of skilled physical therapy  The patient demonstrates signs and symptoms consistent with cervical stenosis, gait abnormality, cervical spondylosis without myelopathy s/p ACDF C3-C6 on 3/3/22  During the examination the patient demonstrated improved postural/core/R LE strength, improved but decreased balance, improved gait, improved activity intolerance, and decreased R LE pain  The patient has made functional gains since starting therapy  The patient is now able to walk in the community longer distances with her SPC  The patient feels she is able to navigate unlevel surfaces with less difficulty/imbalance  The patient reports more ease with walking to go to [employer]  The patient denies having any functional limits presently  The patient will be discharged from formal PT to an independent HEP - She has achieved most of her goals  Symptom irritability: lowBarriers to therapy: Chronicity of condition  Understanding of Dx/Px/POC: good   Prognosis: good  Prognosis details: AGE; MEDICAL HX    Goals  STG: Achieve in 4-6 weeks   1  Patient's R thigh pain at worst is no greater than 3/10 to improve activity tolerance  MET   2  Patient's cervical/thoracic improve to "minimal restriction" all motions tested to allow for function ROM with ADL's  MET  3    Patient's R LE  MMT improve by 1/2-1 muscle grade to improve ambulation endurance for shopping/leisure  MET  4  Patient's tandem stance balance improve to >20 seconds without LOB B/L LE to improve balance for gait  MET  5  Timed up and Go score improve to less than 14 seconds to indicate decreased falls risk/improved balance  NOT MET    LTG: Achieve in 6-12 weeks  1  Patient tolerate walking in the community without R LE pain/ gait instability to achieve their personal therapy goal   MET  2  Patient's strength at core/R LE improve to [facility] to allow completion of leisure activities  MET  3  Patient to be independent with home exercise plan at time of discharge  MET  4  Patient's FOTO score improve to > 55% to indicate a return to normal function  MET 7/13  5  30 second sit to stand score improve by 3 stands (>10 total for age norm) to indicate improved transfers  PARTIALLY MET        Plan  Plan details: D/C PT TO AN INDEPENDENT [facility]  Treatment plan discussed with: PTA and patient        Subjective Evaluation    History of Present Illness  Date of surgery: 3/3/2022  Mechanism of injury: surgery  Mechanism of injury: SUBJECTIVE: 8/10/2022: The patient reports improvement since last therapy assessment  The patient is now able to walk in the community  " a little better", and notes improved steadiness  The patient feels her endurance is " a little better"  The patient denies having any functional limitations  The patient will be discharged from formal PT to an independent HEP  SUBJECTIVE: 7/13/2022: The patent reports improvement with her gait since starting therapy  The patient reports improvement with walking at home - she does not use a cane  The patient continues to have difficulty with her balance and walking with unlevel surfaces    The patient will benefit from continued PT to achieve her goals of therapy          INJURY HISTORY: Elizabet Eddy is a 80 y o  female that presents to outpatient physical therapy with complaints of R leg pain and difficulty walking  The patient reports these symptoms have been present for the past 2-3 years  The patient recently received a multiple level anterior cervical fusion ( ACDF C3-C6) on 3/3/2022 done by Dr Love Mccarthy  The patient denies any post operative complications/issues  The patient had a F/U with the surgery team and the patient complained of persistent difficulty with walking/steadiness, and R LE strength  The patient notes difficulty with prolonged standing/walking/walking on unlevel surfaces/transferring up from a low seat  The patient was given a referral for C-spine stenosis and gait instability  The patient would like to focus on her gait  The patient's main goal for physical therapy is to " walk better"  Pain  Current pain ratin  At best pain ratin  At worst pain rating: 3  Location: R ant thigh to knee  Quality: dull ache  Relieving factors: medications  Progression: no change    Social Support  Steps to enter house: yes  1  Stairs in house: yes   2  [facility] in: [facility]  Lives with: alone    Hand dominance: right    Treatments  Previous treatment: physical therapy  Patient Goals  Patient goal: walk better ( MET)        Objective     Concurrent Complaints  Negative for night pain, disturbed sleep, dizziness, faints, headaches, nausea/motion sickness, tinnitus, trouble swallowing, difficulty breathing, shortness of breath, respiratory pain, visual change, history of cancer, history of trauma and infection    Static Posture     Head  Forward  Shoulders  Rounded  Thoracic Spine  Hyperkyphosis  Lumbar Spine   Flattened  Lumbar spine (Left): Shifted  Pelvis   Posterior pelvic tilt    Palpation     Additional Palpation Details  NO TTP    Neurological Testing     Sensation   Cervical/Thoracic   Left   Intact: light touch  Paresthesia: light touch    Right   Intact: light touch  Paresthesia: light touch    Comments   Left light touch: fingertips  Right light touch: fingertips       Reflexes   Left   Burnett's reflex: negative    Right   Burnett's reflex: negative    Active Range of Motion   Cervical/Thoracic Spine       Cervical    Flexion:  Restriction level: minimal  Extension:  Restriction level: minimal  Left lateral flexion:  Restriction level: minimal  Right lateral flexion:  Restriction level minimal  Left rotation:  Restriction level: minimal  Right rotation:  Restriction level: minimal    Thoracic    Flexion:  Restriction level: minimal  Extension:  Restriction level: moderate    Additional Active Range of Motion Details  NO C/O PAIN OR PROBLEMS    Strength/Myotome Testing   Cervical Spine     Left   Interossei strength (t1): 4    Right   Interossei strength (t1): 4    Left Shoulder     Planes of Motion   Flexion: 4+   Abduction: 4+   External rotation at 0°: 4     Right Shoulder     Planes of Motion   Flexion: 4+   Abduction: 4+   External rotation at 0°: 4     Left Elbow   Flexion: 5  Extension: 5    Right Elbow   Flexion: 5  Extension: 5    Left Wrist/Hand   Wrist extension: 5  Wrist flexion: 5  Thumb extension: 4    Right Wrist/Hand   Wrist extension: 5  Wrist flexion: 5  Thumb extension: 4    Left Hip   Planes of Motion   Flexion: 4  Extension: 4  Abduction: 4  Adduction: 4    Right Hip   Planes of Motion   Flexion: 4  Extension: 4  Abduction: 4  Adduction: 4    Left Knee   Flexion: 4  Extension: 4    Right Knee   Flexion: 4-  Extension: 4    Left Ankle/Foot   Dorsiflexion: 5  Plantar flexion: 5    Right Ankle/Foot   Dorsiflexion: 4+  Plantar flexion: 4+    Additional Strength Details  IMPROVED  IMPROVED    Muscle Activation     Additional Muscle Activation Details  Improved but Decreased TrA, multifidus, gluteal activation with position changes      Tests     Additional Tests Details  FOTO: 63% ( predicted 53%) ( improved 3 %)     30SSTS: 9 stands used B/L hands ( 10 is age norm) improved 2 stands    Gait impairments: Patient ambulates with SPC WBAT B/L LE  The patient demonstrates improved but still slow gait speed, unequal step lengths, wide TRI,  R lateral trunk lean,  and Improved but impaired heel-toe rollover R LE      Gait without SPC: Improved posture, unequal step lengths, slow gait speed    TU seconds w/ SPC ( NO CHANGE)    Tandem stand:L: L: 23 seconds ( improved 5 seconds)  R: 30 seconds  Tandem gait: patient takes 2-3 steps prior to needing UE support ( improved)  Neuro Exam:     Headaches   Patient reports headaches: No                 Precautions: RECENT C-SPINE FUSION C3-C6 ON 3/3/2022  RE:     Precautions: FALLS - USE BELT  [id number]     Specialty Daily Treatment Diary     Manual  8/8 8/10 7/27 8/1 8/3                                               Ther Ex 8/8 8/10 7/27 8/1 8/3           Repeated flexion in sitting        LAQ        Stand UBE ALT 90/70 90/70 x4 min stand  120/70 x 4 mins stand 120/70 x 4 mins stand 120/70 x 4 mins stand   Bridges                T-spine/L-spine extensions Sit x10  Stand x10 Stand x10 Sit x10  Stand x10 Sit x10  Stand x10 Sit x10  Stand x10   Nu -step towel roll S= 9 L3 x10 min L3 x 10 mins L3 x10 min L3 x10 min L3 x10 min   Neuro Re-Ed        Alt step taps XL        Foam Balance feet together EO                       EC        Towel roll education        Tandem Stand EO Foam EO/EC  :30x1 ea 1H Foam EO/EC  :30x1 ea 1H  Foam EO/EC  :30x1 ea Foam EO/EC  :30x1 ea   Core brace / belly pull in        kegels        multifidi        Blue foam step overs // bars x10 B/L x10 B/L x10 B/L x10 B/L x10 B/L   CSMi weight shift XY  Weight shift fwd/side 0H x2' ea  x10 squats 1 H   XY  Weight shift fwd/side 0H x1'30" ea  x10 squats 1 H XY  Weight shift fwd/side 0H x2' ea  x10 squats 1 H   Seated bubble [employer]        Gait Training        Heel-toe amb // bars 10ft x4 // bars 10ft x 4 10 ft x4 10ft x4 10ft x4   Hurdles Amb OBO // bars 1H   10ft x4 10ft x 4 OBO // bar   // bars 1H   10ft x4   Shop walk // 10ft x4 ea 0H 10ft x 4   // 10 ft x2 ea 0H   6 minute walk with cues Encouraged to do later today at home  4 laps, rest, 1 5 laps w/ cane 4 laps, rest, 2 laps w/ cane Encouraged to do later today at home     SPC gait in gym        Backwards walk *// bars 10ft x4 // bar 10ft x 4      [employer] weave SPC 25ft x4 25ft x 4 25ft x2 25ft x2 25ft x2   Ther Activity        Chair Squats        Up/down steps SOS                        Chair squats with step taps        Step ups fwd/lat B/L x10 ea  Fwd 8"  Side 6"    B/L x10 fwd/side 6" B/L x10 fwd/side 4" (6"n/a) B/L x10 fwd/side 6"

## 2022-08-10 ENCOUNTER — EVALUATION (OUTPATIENT)
Dept: PHYSICAL THERAPY | Facility: CLINIC | Age: 82
End: 2022-08-10
Payer: COMMERCIAL

## 2022-08-10 DIAGNOSIS — R26.9 GAIT ABNORMALITY: ICD-10-CM

## 2022-08-10 DIAGNOSIS — M48.02 STENOSIS OF CERVICAL SPINE REGION: Primary | ICD-10-CM

## 2022-08-10 DIAGNOSIS — M47.812 CERVICAL SPONDYLOSIS WITHOUT MYELOPATHY: ICD-10-CM

## 2022-08-10 DIAGNOSIS — Z98.1 S/P CERVICAL SPINAL FUSION: ICD-10-CM

## 2022-08-10 PROCEDURE — 97110 THERAPEUTIC EXERCISES: CPT | Performed by: PHYSICAL THERAPIST

## 2022-08-10 PROCEDURE — 97116 GAIT TRAINING THERAPY: CPT | Performed by: PHYSICAL THERAPIST

## 2022-08-10 PROCEDURE — 97112 NEUROMUSCULAR REEDUCATION: CPT | Performed by: PHYSICAL THERAPIST

## 2022-08-31 ENCOUNTER — OFFICE VISIT (OUTPATIENT)
Dept: CARDIOLOGY CLINIC | Facility: CLINIC | Age: 82
End: 2022-08-31
Payer: COMMERCIAL

## 2022-08-31 VITALS
WEIGHT: 200 LBS | HEIGHT: 58 IN | BODY MASS INDEX: 41.98 KG/M2 | SYSTOLIC BLOOD PRESSURE: 138 MMHG | DIASTOLIC BLOOD PRESSURE: 74 MMHG | HEART RATE: 72 BPM

## 2022-08-31 DIAGNOSIS — E78.2 MIXED HYPERLIPIDEMIA: ICD-10-CM

## 2022-08-31 DIAGNOSIS — Z95.5 H/O HEART ARTERY STENT: Primary | ICD-10-CM

## 2022-08-31 DIAGNOSIS — I25.10 ATHEROSCLEROSIS OF NATIVE CORONARY ARTERY OF NATIVE HEART WITHOUT ANGINA PECTORIS: ICD-10-CM

## 2022-08-31 DIAGNOSIS — I10 ESSENTIAL (PRIMARY) HYPERTENSION: ICD-10-CM

## 2022-08-31 PROCEDURE — 99214 OFFICE O/P EST MOD 30 MIN: CPT | Performed by: INTERNAL MEDICINE

## 2022-08-31 NOTE — PROGRESS NOTES
Subjective:        Patient ID: Boby Valenzuela is a 80 y o  female  Chief Complaint:  Sen Mi is here for routine follow-up, offers no cardiopulmonary complaints on extensive questioning  Denies any chest pain shortness of breath palpitations presyncope syncope TIA or claudication like symptoms  The following portions of the patient's history were reviewed and updated as appropriate: allergies, current medications, past family history, past medical history, past social history, past surgical history and problem list   Review of Systems   Constitutional: Negative for chills, diaphoresis, malaise/fatigue and weight gain  HENT: Negative for nosebleeds and stridor  Eyes: Negative for double vision, vision loss in left eye, vision loss in right eye and visual disturbance  Cardiovascular: Negative for chest pain, claudication, cyanosis, dyspnea on exertion, irregular heartbeat, leg swelling, near-syncope, orthopnea, palpitations, paroxysmal nocturnal dyspnea and syncope  Respiratory: Negative for cough, shortness of breath, snoring and wheezing  Endocrine: Negative for polydipsia, polyphagia and polyuria  Hematologic/Lymphatic: Negative for bleeding problem  Does not bruise/bleed easily  Skin: Negative for flushing and rash  Musculoskeletal: Negative for falls and myalgias  Gastrointestinal: Negative for abdominal pain, heartburn, hematemesis, hematochezia, melena and nausea  Genitourinary: Negative for hematuria  Neurological: Negative for brief paralysis, dizziness, focal weakness, headaches, light-headedness, loss of balance and vertigo  Psychiatric/Behavioral: Negative for altered mental status and substance abuse  Allergic/Immunologic: Negative for hives  Objective:      /74   Pulse 72   Ht 4' 10" (1 473 m)   Wt 90 7 kg (200 lb)   BMI 41 80 kg/m²   Physical Exam  Vitals and nursing note reviewed  Constitutional:       Appearance: She is well-developed   She is not diaphoretic  HENT:      Head: Normocephalic and atraumatic  Eyes:      General: No scleral icterus  Pupils: Pupils are equal, round, and reactive to light  Neck:      Thyroid: No thyromegaly  Vascular: No carotid bruit or JVD  Cardiovascular:      Rate and Rhythm: Normal rate and regular rhythm  Pulses: Intact distal pulses  Heart sounds: Murmur ( grade 1 AS quality murmur right upper sternal border preserved S2) heard  No friction rub  No gallop  Pulmonary:      Effort: Pulmonary effort is normal  No respiratory distress  Breath sounds: Normal breath sounds  No stridor  No wheezing or rales  Abdominal:      General: Bowel sounds are normal       Palpations: Abdomen is soft  There is no mass  Tenderness: There is no abdominal tenderness  Musculoskeletal:         General: Normal range of motion  Cervical back: Normal range of motion and neck supple  Right lower leg: No edema  Left lower leg: No edema  Lymphadenopathy:      Cervical: No cervical adenopathy  Skin:     General: Skin is warm and dry  Coloration: Skin is not pale  Findings: No rash  Neurological:      Mental Status: She is alert and oriented to person, place, and time  Psychiatric:         Behavior: Behavior normal          Thought Content: Thought content normal          Judgment: Judgment normal          Lab Review:   No visits with results within 2 Month(s) from this visit  Latest known visit with results is:   Hospital Outpatient Visit on 02/11/2022   Component Date Value    Protocol Name 02/11/2022 BRENDA SIT     Time In Exercise Phase 02/11/2022 00:03:15     MAX   SYSTOLIC BP 63/81/7177 349     Max Diastolic Bp 94/16/6923 90     Max Heart Rate 02/11/2022 89     Max Predicted Heart Rate 02/11/2022 141     Reason for Termination 02/11/2022 Test Complete     Test Indication 02/11/2022 Pre-Op Evaluation     Target Hr Formular 02/11/2022 (220 - Age)*85%     Arrhy During Ex 02/11/2022 none     Chest Pain Statement 02/11/2022 none      No results found  Assessment:       1  H/O heart artery stent     2  Mixed hyperlipidemia     3  Essential (primary) hypertension     4  Atherosclerosis of native coronary artery of native heart without angina pectoris          Plan:       Yu Brody is without any signs or symptoms reminiscent of angina heart failure nor electrical instability  With recent stress testing will think any further cardiac workup is warranted at this time  Her blood pressure lipids are reasonably well controlled, meds from a cardiac perspective optimal in my opinion, I recommended no changes today  Her aortic valve murmur sounds as if this remains mild, will consider follow-up echocardiography after my next visit with her in 6 months time, SBE antibiotic prophylaxis is not required  We made no changes today, asked her to call me prior to his 6 month scheduled follow-up visit if she develops any concerning potential cardiac symptoms in the meantime

## 2022-08-31 NOTE — PATIENT INSTRUCTIONS
Cholesterol and Your Health   AMBULATORY CARE:   Cholesterol  is a waxy, fat-like substance  Your body uses cholesterol to make hormones and new cells, and to protect nerves  Cholesterol is made by your body  It also comes from certain foods you eat, such as meat and dairy products  Your healthcare provider can help you set goals for your cholesterol levels  He or she can help you create a plan to meet your goals  Cholesterol level goals: Your cholesterol level goals depend on your risk for heart disease, your age, and your other health conditions  The following are general guidelines: Total cholesterol  includes low-density lipoprotein (LDL), high-density lipoprotein (HDL), and triglyceride levels  The total cholesterol level should be lower than 200 mg/dL and is best at about 150 mg/dL  LDL cholesterol  is called bad cholesterol  because it forms plaque in your arteries  As plaque builds up, your arteries become narrow, and less blood flows through  When plaque decreases blood flow to your heart, you may have chest pain  If plaque completely blocks an artery that brings blood to your heart, you may have a heart attack  Plaque can break off and form blood clots  Blood clots may block arteries in your brain and cause a stroke  The level should be less than 130 mg/dL and is best at about 100 mg/dL  HDL cholesterol  is called good cholesterol  because it helps remove LDL cholesterol from your arteries  It does this by attaching to LDL cholesterol and carrying it to your liver  Your liver breaks down LDL cholesterol so your body can get rid of it  High levels of HDL cholesterol can help prevent a heart attack and stroke  Low levels of HDL cholesterol can increase your risk for heart disease, heart attack, and stroke  The level should be 60 mg/dL or higher  Triglycerides  are a type of fat that store energy from foods you eat  High levels of triglycerides also cause plaque buildup   This can increase your risk for a heart attack or stroke  If your triglyceride level is high, your LDL cholesterol level may also be high  The level should be less than 150 mg/dL  Any of the following can increase your risk for high cholesterol:   Smoking cigarettes    Being overweight or obese, or not getting enough exercise    Drinking large amounts of alcohol    A medical condition such as hypertension (high blood pressure) or diabetes    Certain genes passed from your parents to you    Age older than 65 years    What you need to know about having your cholesterol levels checked: Adults 21to 39years of age should have their cholesterol levels checked every 4 to 6 years  Adults 45 years or older should have their cholesterol checked every 1 to 2 years  You may need your cholesterol checked more often, or at a younger age, if you have risk factors for heart disease  You may also need to have your cholesterol checked more often if you have other health conditions, such as diabetes  Blood tests are used to check cholesterol levels  Blood tests measure your levels of triglycerides, LDL cholesterol, and HDL cholesterol  How healthy fats affect your cholesterol levels:  Healthy fats, also called unsaturated fats, help lower LDL cholesterol and triglyceride levels  Healthy fats include the following:  Monounsaturated fats  are found in foods such as olive oil, canola oil, avocado, nuts, and olives  Polyunsaturated fats,  such as omega 3 fats, are found in fish, such as salmon, trout, and tuna  They can also be found in plant foods such as flaxseed, walnuts, and soybeans  How unhealthy fats affect your cholesterol levels:  Unhealthy fats increase LDL cholesterol and triglyceride levels  They are found in foods high in cholesterol, saturated fat, and trans fat:  Cholesterol  is found in eggs, dairy, and meat  Saturated fat  is found in butter, cheese, ice cream, whole milk, and coconut oil   Saturated fat is also found in meat, such as sausage, hot dogs, and bologna  Trans fat  is found in liquid oils and is used in fried and baked foods  Foods that contain trans fats include chips, crackers, muffins, sweet rolls, microwave popcorn, and cookies  Treatment  for high cholesterol will also decrease your risk of heart disease, heart attack, and stroke  Treatment may include any of the following:  Lifestyle changes  may include food, exercise, weight loss, and quitting smoking  You may also need to decrease the amount of alcohol you drink  Your healthcare provider will want you to start with lifestyle changes  Other treatment may be added if lifestyle changes are not enough  Your healthcare provider may recommend you work with a team to manage hyperlipidemia  The team may include medical experts such as a dietitian, an exercise or physical therapist, and a behavior therapist  Your family members may be included in helping you create lifestyle changes  Medicines  may be given to lower your LDL cholesterol, triglyceride levels, or total cholesterol level  You may need medicines to lower your cholesterol if any of the following is true:    You have a history of stroke, TIA, unstable angina, or a heart attack  Your LDL cholesterol level is 190 mg/dL or higher  You are age 36 to 76 years, have diabetes or heart disease risk factors, and your LDL cholesterol is 70 mg/dL or higher  Supplements  include fish oil, red yeast rice, and garlic  Fish oil may help lower your triglyceride and LDL cholesterol levels  It may also increase your HDL cholesterol level  Red yeast rice may help decrease your total cholesterol level and LDL cholesterol level  Garlic may help lower your total cholesterol level  Do not take any supplements without talking to your healthcare provider  Food changes you can make to lower your cholesterol levels:  A dietitian can help you create a healthy eating plan   He or she can show you how to read food labels and choose foods low in saturated fat, trans fats, and cholesterol  Decrease the total amount of fat you eat  Choose lean meats, fat-free or 1% fat milk, and low-fat dairy products, such as yogurt and cheese  Try to limit or avoid red meats  Limit or do not eat fried foods or baked goods, such as cookies  Replace unhealthy fats with healthy fats  Cook foods in olive oil or canola oil  Choose soft margarines that are low in saturated fat and trans fat  Seeds, nuts, and avocados are other examples of healthy fats  Eat foods with omega-3 fats  Examples include salmon, tuna, mackerel, walnuts, and flaxseed  Eat fish 2 times per week  Pregnant women should not eat fish that have high levels of mercury, such as shark, swordfish, and alisson mackerel  Increase the amount of high-fiber foods you eat  High-fiber foods can help lower your LDL cholesterol  Aim to get between 20 and 30 grams of fiber each day  Fruits and vegetables are high in fiber  Eat at least 5 servings each day  Other high-fiber foods are whole-grain or whole-wheat breads, pastas, or cereals, and brown rice  Eat 3 ounces of whole-grain foods each day  Increase fiber slowly  You may have abdominal discomfort, bloating, and gas if you add fiber to your diet too quickly  Eat healthy protein foods  Examples include low-fat dairy products, skinless chicken and turkey, fish, and nuts  Limit foods and drinks that are high in sugar  Your dietitian or healthcare provider can help you create daily limits for high-sugar foods and drinks  The limit may be lower if you have diabetes or another health condition  Limits can also help you lose weight if needed  Lifestyle changes you can make to lower your cholesterol levels:   Maintain a healthy weight  Ask your healthcare provider what a healthy weight is for you  Ask him or her to help you create a weight loss plan if needed   Weight loss can decrease your total cholesterol and triglyceride levels  Weight loss may also help keep your blood pressure at a healthy level  Be physically active throughout the day  Physical activity, such as exercise, can help lower your total cholesterol level and maintain a healthy weight  Physical activity can also help increase your HDL cholesterol level  Work with your healthcare provider to create an program that is right for you  Get at least 30 to 40 minutes of moderate physical activity most days of the week  Examples of exercise include brisk walking, swimming, or biking  Also include strength training at least 2 times each week  Your healthcare providers can help you create a physical activity plan  Do not smoke  Nicotine and other chemicals in cigarettes and cigars can raise your cholesterol levels  Ask your healthcare provider for information if you currently smoke and need help to quit  E-cigarettes or smokeless tobacco still contain nicotine  Talk to your healthcare provider before you use these products  Limit or do not drink alcohol  Alcohol can increase your triglyceride levels  Ask your healthcare provider before you drink alcohol  Ask how much is okay for you to drink in 24 hours or 1 week  Follow up with your doctor as directed:  Write down your questions so you remember to ask them during your visits  © Copyright [employer] 2022 Information is for End User's use only and may not be sold, redistributed or otherwise used for commercial purposes  All illustrations and images included in CareNotes® are the copyrighted property of [employer]  or [facility] Wayne Barrientos  The above information is an  only  It is not intended as medical advice for individual conditions or treatments  Talk to your doctor, nurse or pharmacist before following any medical regimen to see if it is safe and effective for you

## 2023-02-28 DIAGNOSIS — E78.2 MIXED HYPERLIPIDEMIA: ICD-10-CM

## 2023-02-28 RX ORDER — EZETIMIBE 10 MG/1
TABLET ORAL
Qty: 90 TABLET | Refills: 3 | Status: SHIPPED | OUTPATIENT
Start: 2023-02-28

## 2023-05-05 ENCOUNTER — OFFICE VISIT (OUTPATIENT)
Dept: CARDIOLOGY CLINIC | Facility: CLINIC | Age: 83
End: 2023-05-05

## 2023-05-05 VITALS
HEART RATE: 65 BPM | BODY MASS INDEX: 40.72 KG/M2 | SYSTOLIC BLOOD PRESSURE: 126 MMHG | DIASTOLIC BLOOD PRESSURE: 80 MMHG | WEIGHT: 194 LBS | HEIGHT: 58 IN

## 2023-05-05 DIAGNOSIS — E11.69 DYSLIPIDEMIA ASSOCIATED WITH TYPE 2 DIABETES MELLITUS (HCC): ICD-10-CM

## 2023-05-05 DIAGNOSIS — E78.5 DYSLIPIDEMIA ASSOCIATED WITH TYPE 2 DIABETES MELLITUS (HCC): ICD-10-CM

## 2023-05-05 DIAGNOSIS — I25.10 ATHEROSCLEROSIS OF NATIVE CORONARY ARTERY OF NATIVE HEART WITHOUT ANGINA PECTORIS: ICD-10-CM

## 2023-05-05 DIAGNOSIS — I10 ESSENTIAL (PRIMARY) HYPERTENSION: ICD-10-CM

## 2023-05-05 DIAGNOSIS — I51.9 HEART DISEASE, UNSPECIFIED: ICD-10-CM

## 2023-05-05 DIAGNOSIS — Z95.5 H/O HEART ARTERY STENT: Primary | ICD-10-CM

## 2023-05-05 NOTE — PROGRESS NOTES
Subjective:        Patient ID: Sudha Rushing is a [de-identified] y o  female  Chief Complaint:  Nicolasa Mejia is here for routine follow-up for CAD status post remote stenting with treated hypertension and dyslipidemia/diabetes  She has not had any major hospital events since our last visit denies any chest pain shortness of breath palpitations presyncope syncope TIA or claudication-like symptoms  EKG stable, sinus rhythm/arrhythmia 67 bpm, leftward axis, no alarming ST changes  The following portions of the patient's history were reviewed and updated as appropriate: allergies, current medications, past family history, past medical history, past social history, past surgical history and problem list   Review of Systems   Constitutional: Negative for chills, diaphoresis, malaise/fatigue and weight gain  HENT: Negative for nosebleeds and stridor  Eyes: Negative for double vision, vision loss in left eye, vision loss in right eye and visual disturbance  Cardiovascular: Negative for chest pain, claudication, cyanosis, dyspnea on exertion, irregular heartbeat, leg swelling, near-syncope, orthopnea, palpitations, paroxysmal nocturnal dyspnea and syncope  Respiratory: Negative for cough, shortness of breath, snoring and wheezing  Endocrine: Negative for polydipsia, polyphagia and polyuria  Hematologic/Lymphatic: Negative for bleeding problem  Does not bruise/bleed easily  Skin: Negative for flushing and rash  Musculoskeletal: Positive for arthritis  Negative for falls and myalgias  Gastrointestinal: Negative for abdominal pain, heartburn, hematemesis, hematochezia, melena and nausea  Genitourinary: Negative for hematuria  Neurological: Negative for brief paralysis, dizziness, focal weakness, headaches, light-headedness, loss of balance and vertigo  Psychiatric/Behavioral: Negative for altered mental status and substance abuse  Allergic/Immunologic: Negative for hives            Objective:      BP "126/80   Pulse 65   Ht 4' 10\" (1 473 m)   Wt 88 kg (194 lb)   BMI 40 55 kg/m²   Physical Exam  Vitals and nursing note reviewed  Constitutional:       Appearance: She is well-developed  She is not diaphoretic  HENT:      Head: Normocephalic and atraumatic  Eyes:      General: No scleral icterus  Pupils: Pupils are equal, round, and reactive to light  Neck:      Thyroid: No thyromegaly  Vascular: No carotid bruit or JVD  Cardiovascular:      Rate and Rhythm: Normal rate and regular rhythm  Pulses: Intact distal pulses  Heart sounds: Normal heart sounds  No murmur heard  No friction rub  No gallop  Pulmonary:      Effort: Pulmonary effort is normal  No respiratory distress  Breath sounds: Normal breath sounds  No stridor  No wheezing or rales  Abdominal:      General: Bowel sounds are normal       Palpations: Abdomen is soft  There is no mass  Tenderness: There is no abdominal tenderness  Musculoskeletal:      Cervical back: Normal range of motion and neck supple  Right lower leg: No edema  Left lower leg: No edema  Lymphadenopathy:      Cervical: No cervical adenopathy  Skin:     General: Skin is warm and dry  Coloration: Skin is not pale  Findings: No rash  Neurological:      Mental Status: She is alert and oriented to person, place, and time  Psychiatric:         Mood and Affect: Mood normal          Behavior: Behavior normal          Thought Content: Thought content normal          Judgment: Judgment normal          Lab Review:   No visits with results within 2 Month(s) from this visit  Latest known visit with results is:   Hospital Outpatient Visit on 02/11/2022   Component Date Value    Protocol Name 02/11/2022 BRENDA SIT     Time In Exercise Phase 02/11/2022 00:03:15     MAX   SYSTOLIC BP 37/74/8447 200     Max Diastolic Bp 71/58/4153 90     Max Heart Rate 02/11/2022 89     Max Predicted Heart Rate 02/11/2022 141     Reason " for Termination 02/11/2022 Test Complete     Test Indication 02/11/2022 Pre-Op Evaluation     Target Hr Formular 02/11/2022 (220 - Age)*85%     Arrhy During Ex 02/11/2022 none     Chest Pain Statement 02/11/2022 none      No results found  Assessment:       1  H/O heart artery stent  CBC and differential    Lipid panel      2  Heart disease, unspecified  CBC and differential      3  Essential (primary) hypertension  Comprehensive metabolic panel      4  Dyslipidemia associated with type 2 diabetes mellitus ([facility] [address] )  Lipid panel           Plan:       Scotty Leong has no signs or symptoms reminiscent of angina pectoris heart failure nor electrical instability  Blood pressure is well controlled  No recent blood work to review  Appropriate follow-up blood work ordered, asked her to contact your office first to see if you like to add anything  She is on proper statin therapy, no alarming myalgias  Stress testing and echocardiography within the last year and a half stable  Ordered no acute cardiac testing, asked her to call me prior to her 6-month scheduled follow-up visit if she develops any concerning potential cardiac symptoms or needs major surgery prior

## 2023-05-05 NOTE — PATIENT INSTRUCTIONS
Cholesterol and Your Health   AMBULATORY CARE:   Cholesterol  is a waxy, fat-like substance  Your body uses cholesterol to make hormones and new cells, and to protect nerves  Cholesterol is made by your body  It also comes from certain foods you eat, such as meat and dairy products  Your healthcare provider can help you set goals for your cholesterol levels  He or she can help you create a plan to meet your goals  Cholesterol level goals: Your cholesterol level goals depend on your risk for heart disease, your age, and your other health conditions  The following are general guidelines: Total cholesterol  includes low-density lipoprotein (LDL), high-density lipoprotein (HDL), and triglyceride levels  The total cholesterol level should be lower than 200 mg/dL and is best at about 150 mg/dL  LDL cholesterol  is called bad cholesterol  because it forms plaque in your arteries  As plaque builds up, your arteries become narrow, and less blood flows through  When plaque decreases blood flow to your heart, you may have chest pain  If plaque completely blocks an artery that brings blood to your heart, you may have a heart attack  Plaque can break off and form blood clots  Blood clots may block arteries in your brain and cause a stroke  The level should be less than 130 mg/dL and is best at about 100 mg/dL  HDL cholesterol  is called good cholesterol  because it helps remove LDL cholesterol from your arteries  It does this by attaching to LDL cholesterol and carrying it to your liver  Your liver breaks down LDL cholesterol so your body can get rid of it  High levels of HDL cholesterol can help prevent a heart attack and stroke  Low levels of HDL cholesterol can increase your risk for heart disease, heart attack, and stroke  The level should be 60 mg/dL or higher  Triglycerides  are a type of fat that store energy from foods you eat  High levels of triglycerides also cause plaque buildup   This can increase your risk for a heart attack or stroke  If your triglyceride level is high, your LDL cholesterol level may also be high  The level should be less than 150 mg/dL  Any of the following can increase your risk for high cholesterol:   Smoking cigarettes    Being overweight or obese, or not getting enough exercise    Drinking large amounts of alcohol    A medical condition such as hypertension (high blood pressure) or diabetes    Certain genes passed from your parents to you    Age older than 65 years    What you need to know about having your cholesterol levels checked: Adults 21to 39years of age should have their cholesterol levels checked every 4 to 6 years  Adults 45 years or older should have their cholesterol checked every 1 to 2 years  You may need your cholesterol checked more often, or at a younger age, if you have risk factors for heart disease  You may also need to have your cholesterol checked more often if you have other health conditions, such as diabetes  Blood tests are used to check cholesterol levels  Blood tests measure your levels of triglycerides, LDL cholesterol, and HDL cholesterol  How healthy fats affect your cholesterol levels:  Healthy fats, also called unsaturated fats, help lower LDL cholesterol and triglyceride levels  Healthy fats include the following:  Monounsaturated fats  are found in foods such as olive oil, canola oil, avocado, nuts, and olives  Polyunsaturated fats,  such as omega 3 fats, are found in fish, such as salmon, trout, and tuna  They can also be found in plant foods such as flaxseed, walnuts, and soybeans  How unhealthy fats affect your cholesterol levels:  Unhealthy fats increase LDL cholesterol and triglyceride levels  They are found in foods high in cholesterol, saturated fat, and trans fat:  Cholesterol  is found in eggs, dairy, and meat  Saturated fat  is found in butter, cheese, ice cream, whole milk, and coconut oil   Saturated fat is also found in meat, such as sausage, hot dogs, and bologna  Trans fat  is found in liquid oils and is used in fried and baked foods  Foods that contain trans fats include chips, crackers, muffins, sweet rolls, microwave popcorn, and cookies  Treatment  for high cholesterol will also decrease your risk of heart disease, heart attack, and stroke  Treatment may include any of the following:  Lifestyle changes  may include food, exercise, weight loss, and quitting smoking  You may also need to decrease the amount of alcohol you drink  Your healthcare provider will want you to start with lifestyle changes  Other treatment may be added if lifestyle changes are not enough  Your healthcare provider may recommend you work with a team to manage hyperlipidemia  The team may include medical experts such as a dietitian, an exercise or physical therapist, and a behavior therapist  Your family members may be included in helping you create lifestyle changes  Medicines  may be given to lower your LDL cholesterol, triglyceride levels, or total cholesterol level  You may need medicines to lower your cholesterol if any of the following is true:    You have a history of stroke, TIA, unstable angina, or a heart attack  Your LDL cholesterol level is 190 mg/dL or higher  You are age 36 to 76 years, have diabetes or heart disease risk factors, and your LDL cholesterol is 70 mg/dL or higher  Supplements  include fish oil, red yeast rice, and garlic  Fish oil may help lower your triglyceride and LDL cholesterol levels  It may also increase your HDL cholesterol level  Red yeast rice may help decrease your total cholesterol level and LDL cholesterol level  Garlic may help lower your total cholesterol level  Do not take any supplements without talking to your healthcare provider  Food changes you can make to lower your cholesterol levels:  A dietitian can help you create a healthy eating plan   He or she can show you how to read food labels and choose foods low in saturated fat, trans fats, and cholesterol  Decrease the total amount of fat you eat  Choose lean meats, fat-free or 1% fat milk, and low-fat dairy products, such as yogurt and cheese  Try to limit or avoid red meats  Limit or do not eat fried foods or baked goods, such as cookies  Replace unhealthy fats with healthy fats  Cook foods in olive oil or canola oil  Choose soft margarines that are low in saturated fat and trans fat  Seeds, nuts, and avocados are other examples of healthy fats  Eat foods with omega-3 fats  Examples include salmon, tuna, mackerel, walnuts, and flaxseed  Eat fish 2 times per week  Pregnant women should not eat fish that have high levels of mercury, such as shark, swordfish, and alisson mackerel  Increase the amount of high-fiber foods you eat  High-fiber foods can help lower your LDL cholesterol  Aim to get between 20 and 30 grams of fiber each day  Fruits and vegetables are high in fiber  Eat at least 5 servings each day  Other high-fiber foods are whole-grain or whole-wheat breads, pastas, or cereals, and brown rice  Eat 3 ounces of whole-grain foods each day  Increase fiber slowly  You may have abdominal discomfort, bloating, and gas if you add fiber to your diet too quickly  Eat healthy protein foods  Examples include low-fat dairy products, skinless chicken and turkey, fish, and nuts  Limit foods and drinks that are high in sugar  Your dietitian or healthcare provider can help you create daily limits for high-sugar foods and drinks  The limit may be lower if you have diabetes or another health condition  Limits can also help you lose weight if needed  Lifestyle changes you can make to lower your cholesterol levels:   Maintain a healthy weight  Ask your healthcare provider what a healthy weight is for you  Ask him or her to help you create a weight loss plan if needed   Weight loss can decrease your total cholesterol and triglyceride levels  Weight loss may also help keep your blood pressure at a healthy level  Be physically active throughout the day  Physical activity, such as exercise, can help lower your total cholesterol level and maintain a healthy weight  Physical activity can also help increase your HDL cholesterol level  Work with your healthcare provider to create an program that is right for you  Get at least 30 to 40 minutes of moderate physical activity most days of the week  Examples of exercise include brisk walking, swimming, or biking  Also include strength training at least 2 times each week  Your healthcare providers can help you create a physical activity plan  Do not smoke  Nicotine and other chemicals in cigarettes and cigars can raise your cholesterol levels  Ask your healthcare provider for information if you currently smoke and need help to quit  E-cigarettes or smokeless tobacco still contain nicotine  Talk to your healthcare provider before you use these products  Limit or do not drink alcohol  Alcohol can increase your triglyceride levels  Ask your healthcare provider before you drink alcohol  Ask how much is okay for you to drink in 24 hours or 1 week  Follow up with your doctor as directed:  Write down your questions so you remember to ask them during your visits  © Copyright [employer] 2022 Information is for End User's use only and may not be sold, redistributed or otherwise used for commercial purposes  The above information is an  only  It is not intended as medical advice for individual conditions or treatments  Talk to your doctor, nurse or pharmacist before following any medical regimen to see if it is safe and effective for you

## 2023-11-16 ENCOUNTER — OFFICE VISIT (OUTPATIENT)
Dept: CARDIOLOGY CLINIC | Facility: CLINIC | Age: 83
End: 2023-11-16
Payer: COMMERCIAL

## 2023-11-16 VITALS
DIASTOLIC BLOOD PRESSURE: 80 MMHG | HEART RATE: 52 BPM | BODY MASS INDEX: 41.31 KG/M2 | SYSTOLIC BLOOD PRESSURE: 140 MMHG | WEIGHT: 196.8 LBS | HEIGHT: 58 IN

## 2023-11-16 DIAGNOSIS — E11.69 DYSLIPIDEMIA ASSOCIATED WITH TYPE 2 DIABETES MELLITUS: ICD-10-CM

## 2023-11-16 DIAGNOSIS — I10 ESSENTIAL (PRIMARY) HYPERTENSION: ICD-10-CM

## 2023-11-16 DIAGNOSIS — E78.5 DYSLIPIDEMIA ASSOCIATED WITH TYPE 2 DIABETES MELLITUS: ICD-10-CM

## 2023-11-16 DIAGNOSIS — Z95.5 H/O HEART ARTERY STENT: Primary | ICD-10-CM

## 2023-11-16 PROCEDURE — 99213 OFFICE O/P EST LOW 20 MIN: CPT | Performed by: INTERNAL MEDICINE

## 2023-11-16 NOTE — PATIENT INSTRUCTIONS
Cholesterol and Your Health   AMBULATORY CARE:   Cholesterol  is a waxy, fat-like substance. Your body uses cholesterol to make hormones and new cells, and to protect nerves. Cholesterol is made by your body. It also comes from certain foods you eat, such as meat and dairy products. Your healthcare provider can help you set goals for your cholesterol levels. Your provider can help you create a plan to meet your goals. Cholesterol level goals: Your cholesterol level goals depend on your risk for heart disease, your age, and your other health conditions. The following are general guidelines: Total cholesterol  includes low-density lipoprotein (LDL), high-density lipoprotein (HDL), and triglyceride levels. The total cholesterol level should be lower than 200 mg/dL and is best at about 150 mg/dL. LDL cholesterol  is called bad cholesterol  because it forms plaque in your arteries. As plaque builds up, your arteries become narrow, and less blood flows through. When plaque decreases blood flow to your heart, you may have chest pain. If plaque completely blocks an artery that brings blood to your heart, you may have a heart attack. Plaque can break off and form blood clots. Blood clots may block arteries in your brain and cause a stroke. The level should be less than 130 mg/dL and is best at about 100 mg/dL. HDL cholesterol  is called good cholesterol  because it helps remove LDL cholesterol from your arteries. It does this by attaching to LDL cholesterol and carrying it to your liver. Your liver breaks down LDL cholesterol so your body can get rid of it. High levels of HDL cholesterol can help prevent a heart attack and stroke. Low levels of HDL cholesterol can increase your risk for heart disease, heart attack, and stroke. The level should be at least 40 mg/dL in males or at least 50 mg/dL in females. Triglycerides  are a type of fat that store energy from foods you eat.  High levels of triglycerides also cause plaque buildup. This can increase your risk for a heart attack or stroke. If your triglyceride level is high, your LDL cholesterol level may also be high. The level should be less than 150 mg/dL. Any of the following can increase your risk for high cholesterol:   Smoking or drinking large amounts of alcohol    Having overweight or obesity, or not getting enough exercise    A medical condition such as hypertension (high blood pressure) or diabetes    A family history of high cholesterol    Age older than 72    What you need to know about having your cholesterol levels checked: Adults 21to 39years of age should have their cholesterol levels checked every 4 to 6 years. Adults 45 years or older should have their cholesterol checked every 1 to 2 years. You may need your cholesterol checked more often, or at a younger age, if you have risk factors for heart disease. You may also need to have your cholesterol checked more often if you have other health conditions, such as diabetes. Blood tests are used to check cholesterol levels. Blood tests measure your levels of triglycerides, LDL cholesterol, and HDL cholesterol. How healthy fats affect your cholesterol levels:  Healthy fats, also called unsaturated fats, help lower LDL cholesterol and triglyceride levels. Healthy fats include the following:  Monounsaturated fats  are found in foods such as olive oil, canola oil, avocado, nuts, and olives. Polyunsaturated fats,  such as omega 3 fats, are found in fish, such as salmon, trout, and tuna. They can also be found in plant foods such as flaxseed, walnuts, and soybeans. How unhealthy fats affect your cholesterol levels:  Unhealthy fats increase LDL cholesterol and triglyceride levels. They are found in foods high in cholesterol, saturated fat, and trans fat:  Cholesterol  is found in eggs, dairy, and meat. Saturated fat  is found in butter, cheese, ice cream, whole milk, and coconut oil.  Saturated fat is also found in meat, such as sausage, hot dogs, and bologna. Trans fat  is found in liquid oils and is used in fried and baked foods. Foods that contain trans fats include chips, crackers, muffins, sweet rolls, microwave popcorn, and cookies. Treatment  for high cholesterol will also decrease your risk of heart disease, heart attack, and stroke. Treatment may include any of the following:  Lifestyle changes  may include food, exercise, weight loss, and quitting smoking. You may also need to decrease the amount of alcohol you drink. Your healthcare provider will want you to start with lifestyle changes. Other treatment may be added if lifestyle changes are not enough. Your healthcare provider may recommend you work with a team to manage hyperlipidemia. The team may include medical experts such as a dietitian, an exercise or physical therapist, and a behavior therapist. Your family members may be included in helping you create lifestyle changes. Medicines  may be given to lower your LDL cholesterol, triglyceride levels, or total cholesterol level. You may need medicines to lower your cholesterol if any of the following is true:    You have a history of stroke, TIA, unstable angina, or a heart attack. Your LDL cholesterol level is 190 mg/dL or higher. You are age 36 to 76 years, have diabetes or heart disease risk factors, and your LDL cholesterol is 70 mg/dL or higher. Supplements  include fish oil, red yeast rice, and garlic. Fish oil may help lower your triglyceride and LDL cholesterol levels. It may also increase your HDL cholesterol level. Red yeast rice may help decrease your total cholesterol level and LDL cholesterol level. Garlic may help lower your total cholesterol level. Do not take any supplements without talking to your healthcare provider. Food changes you can make to lower your cholesterol levels:  A dietitian can help you create a healthy eating plan.  Your dietitian can show you how to read food labels and choose foods low in saturated fat, trans fats, and cholesterol. Decrease the total amount of fat you eat. Choose lean meats, fat-free or 1% fat milk, and low-fat dairy products, such as yogurt and cheese. Try to limit or avoid red meats. Limit or do not eat fried foods or baked goods, such as cookies. Replace unhealthy fats with healthy fats. Cook foods in olive oil or canola oil. Choose soft margarines that are low in saturated fat and trans fat. Seeds, nuts, and avocados are other examples of healthy fats. Eat foods with omega-3 fats. Examples include salmon, tuna, mackerel, walnuts, and flaxseed. Eat fish 2 times per week. Pregnant women should not eat fish that have high levels of mercury, such as shark, swordfish, and alisson mackerel. Increase the amount of high-fiber foods you eat. High-fiber foods can help lower your LDL cholesterol. Aim to get between 20 and 30 grams of fiber each day. Fruits and vegetables are high in fiber. Eat at least 5 servings each day. Other high-fiber foods are whole-grain or whole-wheat breads, pastas, or cereals, and brown rice. Eat 3 ounces of whole-grain foods each day. Increase fiber slowly. You may have abdominal discomfort, bloating, and gas if you add fiber to your diet too quickly. Eat healthy protein foods. Examples include low-fat dairy products, skinless chicken and turkey, fish, and nuts. Limit foods and drinks that are high in sugar. Your dietitian or healthcare provider can help you create daily limits for high-sugar foods and drinks. The limit may be lower if you have diabetes or another health condition. Limits can also help you lose weight if needed. Lifestyle changes you can make to lower your cholesterol levels:   Maintain a healthy weight. Ask your healthcare provider what a healthy weight is for you. Ask your provider to help you create a weight loss plan if needed.  Weight loss can decrease your total cholesterol and triglyceride levels. Weight loss may also help keep your blood pressure at a healthy level. Be physically active throughout the day. Physical activity, such as exercise, can help lower your total cholesterol level and maintain a healthy weight. Physical activity can also help increase your HDL cholesterol level. Work with your healthcare provider to create an program that is right for you. Get at least 30 to 40 minutes of moderate physical activity most days of the week. Examples of exercise include brisk walking, swimming, or biking. Also include strength training at least 2 times each week. Your healthcare providers can help you create a physical activity plan. Do not smoke. Nicotine and other chemicals in cigarettes and cigars can raise your cholesterol levels. Ask your healthcare provider for information if you currently smoke and need help to quit. E-cigarettes or smokeless tobacco still contain nicotine. Talk to your healthcare provider before you use these products. Limit or do not drink alcohol. Alcohol can increase your triglyceride levels. Ask your healthcare provider before you drink alcohol. Ask how much is okay for you to drink in 24 hours or 1 week. Follow up with your doctor as directed:  Write down your questions so you remember to ask them during your visits. © Copyright [employer] 2023 Information is for End User's use only and may not be sold, redistributed or otherwise used for commercial purposes. The above information is an  only. It is not intended as medical advice for individual conditions or treatments. Talk to your doctor, nurse or pharmacist before following any medical regimen to see if it is safe and effective for you.

## 2023-11-16 NOTE — PROGRESS NOTES
Subjective:        Patient ID: Eder Hubbadr is a 82 y.o. female. Chief Complaint:  Cheryn Pheasant offers no cardiac complaints. The following portions of the patient's history were reviewed and updated as appropriate: allergies, current medications, past family history, past medical history, past social history, past surgical history, and problem list.  Review of Systems   Constitutional: Negative for chills, diaphoresis, malaise/fatigue and weight gain. HENT:  Negative for nosebleeds and stridor. Eyes:  Negative for double vision, vision loss in left eye, vision loss in right eye and visual disturbance. Cardiovascular:  Negative for chest pain, claudication, cyanosis, dyspnea on exertion, irregular heartbeat, leg swelling, near-syncope, orthopnea, palpitations, paroxysmal nocturnal dyspnea and syncope. Respiratory:  Negative for cough, shortness of breath, snoring and wheezing. Endocrine: Negative for polydipsia, polyphagia and polyuria. Hematologic/Lymphatic: Negative for bleeding problem. Does not bruise/bleed easily. Skin:  Negative for flushing and rash. Musculoskeletal:  Positive for arthritis. Negative for falls and myalgias. Gastrointestinal:  Negative for abdominal pain, heartburn, hematemesis, hematochezia, melena and nausea. Genitourinary:  Negative for hematuria. Neurological:  Negative for brief paralysis, dizziness, focal weakness, headaches, light-headedness, loss of balance and vertigo. Psychiatric/Behavioral:  Negative for altered mental status and substance abuse. Allergic/Immunologic: Negative for hives. Objective:      /80 (BP Location: Left arm, Patient Position: Sitting)   Pulse (!) 52   Ht 4' 10" (1.473 m)   Wt 89.3 kg (196 lb 12.8 oz)   BMI 41.13 kg/m²   Physical Exam  Vitals and nursing note reviewed. Constitutional:       Appearance: She is well-developed. She is not diaphoretic. HENT:      Head: Normocephalic and atraumatic. Eyes:      General: No scleral icterus. Pupils: Pupils are equal, round, and reactive to light. Neck:      Thyroid: No thyromegaly. Vascular: No carotid bruit or JVD. Cardiovascular:      Rate and Rhythm: Normal rate and regular rhythm. Pulses: Intact distal pulses. Heart sounds: Normal heart sounds. No murmur heard. No friction rub. No gallop. Pulmonary:      Effort: Pulmonary effort is normal. No respiratory distress. Breath sounds: Normal breath sounds. No stridor. No wheezing or rales. Abdominal:      General: Bowel sounds are normal.      Palpations: Abdomen is soft. There is no mass. Tenderness: There is no abdominal tenderness. Musculoskeletal:      Cervical back: Normal range of motion and neck supple. Right lower leg: No edema. Left lower leg: No edema. Lymphadenopathy:      Cervical: No cervical adenopathy. Skin:     General: Skin is warm and dry. Coloration: Skin is not pale. Findings: No rash. Neurological:      Mental Status: She is alert and oriented to person, place, and time. Mental status is at baseline. Psychiatric:         Mood and Affect: Mood normal.         Behavior: Behavior normal.         Lab Review:   No visits with results within 2 Month(s) from this visit. Latest known visit with results is:   Hospital Outpatient Visit on 02/11/2022   Component Date Value    Protocol Name 02/11/2022 BRENDA SIT     Time In Exercise Phase 02/11/2022 00:03:15     MAX. SYSTOLIC BP 70/01/8797 744     Max Diastolic Bp 79/18/8746 90     Max Heart Rate 02/11/2022 89     Max Predicted Heart Rate 02/11/2022 141     Reason for Termination 02/11/2022 Test Complete     Test Indication 02/11/2022 Pre-Op Evaluation     Target Hr Formular 02/11/2022 (220 - Age)*85%     Arrhy During Ex 02/11/2022 none     Chest Pain Statement 02/11/2022 none      No results found. Assessment:       1. H/O heart artery stent        2.  Dyslipidemia associated with type 2 diabetes mellitus         3. Essential (primary) hypertension             Plan:       Joao Salguero has no signs nor sx reminiscent of angina, CHF nor dysrhythmia. Euvolemic, normotensive, HR appropriate for BB Rx, cardiac auscultation otherwise unremarkable. Appropriate bloodwork on order with your next visit. No changes nor testing advised. RTO 6 mos, asked her to call sooner if develops any concerning potential cardiac symptoms or need for major elective surgery prior.

## 2024-04-12 ENCOUNTER — APPOINTMENT (OUTPATIENT)
Dept: LAB | Facility: CLINIC | Age: 84
End: 2024-04-12
Payer: COMMERCIAL

## 2024-04-12 LAB
BASOPHILS # BLD AUTO: 0.07 THOUSANDS/ÂΜL (ref 0–0.1)
BASOPHILS NFR BLD AUTO: 1 % (ref 0–1)
EOSINOPHIL # BLD AUTO: 0.19 THOUSAND/ÂΜL (ref 0–0.61)
EOSINOPHIL NFR BLD AUTO: 2 % (ref 0–6)
ERYTHROCYTE [DISTWIDTH] IN BLOOD BY AUTOMATED COUNT: 13.7 % (ref 11.6–15.1)
HCT VFR BLD AUTO: 38.7 % (ref 34.8–46.1)
HGB BLD-MCNC: 12 G/DL (ref 11.5–15.4)
IMM GRANULOCYTES # BLD AUTO: 0.03 THOUSAND/UL (ref 0–0.2)
IMM GRANULOCYTES NFR BLD AUTO: 0 % (ref 0–2)
LYMPHOCYTES # BLD AUTO: 3.97 THOUSANDS/ÂΜL (ref 0.6–4.47)
LYMPHOCYTES NFR BLD AUTO: 44 % (ref 14–44)
MCH RBC QN AUTO: 31.3 PG (ref 26.8–34.3)
MCHC RBC AUTO-ENTMCNC: 31 G/DL (ref 31.4–37.4)
MCV RBC AUTO: 101 FL (ref 82–98)
MONOCYTES # BLD AUTO: 0.83 THOUSAND/ÂΜL (ref 0.17–1.22)
MONOCYTES NFR BLD AUTO: 9 % (ref 4–12)
NEUTROPHILS # BLD AUTO: 4.04 THOUSANDS/ÂΜL (ref 1.85–7.62)
NEUTS SEG NFR BLD AUTO: 44 % (ref 43–75)
NRBC BLD AUTO-RTO: 0 /100 WBCS
PLATELET # BLD AUTO: 327 THOUSANDS/UL (ref 149–390)
PMV BLD AUTO: 11 FL (ref 8.9–12.7)
RBC # BLD AUTO: 3.83 MILLION/UL (ref 3.81–5.12)
WBC # BLD AUTO: 9.13 THOUSAND/UL (ref 4.31–10.16)

## 2024-04-13 LAB
ALBUMIN SERPL BCP-MCNC: 3.9 G/DL (ref 3.5–5)
ALP SERPL-CCNC: 84 U/L (ref 34–104)
ALT SERPL W P-5'-P-CCNC: 9 U/L (ref 7–52)
ANION GAP SERPL CALCULATED.3IONS-SCNC: 9 MMOL/L (ref 4–13)
AST SERPL W P-5'-P-CCNC: 13 U/L (ref 13–39)
BILIRUB SERPL-MCNC: 0.44 MG/DL (ref 0.2–1)
BUN SERPL-MCNC: 39 MG/DL (ref 5–25)
CALCIUM SERPL-MCNC: 9.3 MG/DL (ref 8.4–10.2)
CHLORIDE SERPL-SCNC: 108 MMOL/L (ref 96–108)
CHOLEST SERPL-MCNC: 199 MG/DL
CO2 SERPL-SCNC: 24 MMOL/L (ref 21–32)
CREAT SERPL-MCNC: 1.49 MG/DL (ref 0.6–1.3)
GFR SERPL CREATININE-BSD FRML MDRD: 32 ML/MIN/1.73SQ M
GLUCOSE P FAST SERPL-MCNC: 180 MG/DL (ref 65–99)
HDLC SERPL-MCNC: 40 MG/DL
LDLC SERPL CALC-MCNC: 111 MG/DL (ref 0–100)
NONHDLC SERPL-MCNC: 159 MG/DL
POTASSIUM SERPL-SCNC: 4.7 MMOL/L (ref 3.5–5.3)
PROT SERPL-MCNC: 6.9 G/DL (ref 6.4–8.4)
SODIUM SERPL-SCNC: 141 MMOL/L (ref 135–147)
TRIGL SERPL-MCNC: 242 MG/DL

## 2024-05-22 ENCOUNTER — OFFICE VISIT (OUTPATIENT)
Dept: CARDIOLOGY CLINIC | Facility: CLINIC | Age: 84
End: 2024-05-22
Payer: COMMERCIAL

## 2024-05-22 VITALS
HEART RATE: 68 BPM | OXYGEN SATURATION: 96 % | SYSTOLIC BLOOD PRESSURE: 136 MMHG | DIASTOLIC BLOOD PRESSURE: 78 MMHG | WEIGHT: 194 LBS | RESPIRATION RATE: 18 BRPM | BODY MASS INDEX: 40.72 KG/M2 | HEIGHT: 58 IN

## 2024-05-22 DIAGNOSIS — E78.5 DYSLIPIDEMIA ASSOCIATED WITH TYPE 2 DIABETES MELLITUS  (HCC): ICD-10-CM

## 2024-05-22 DIAGNOSIS — Z95.5 HISTORY OF PLACEMENT OF STENT IN LAD CORONARY ARTERY: ICD-10-CM

## 2024-05-22 DIAGNOSIS — E78.2 MIXED HYPERLIPIDEMIA: ICD-10-CM

## 2024-05-22 DIAGNOSIS — E11.69 DYSLIPIDEMIA ASSOCIATED WITH TYPE 2 DIABETES MELLITUS  (HCC): ICD-10-CM

## 2024-05-22 DIAGNOSIS — I25.10 ATHEROSCLEROSIS OF NATIVE CORONARY ARTERY OF NATIVE HEART WITHOUT ANGINA PECTORIS: Primary | ICD-10-CM

## 2024-05-22 DIAGNOSIS — I25.10 CORONARY ARTERY DISEASE INVOLVING NATIVE CORONARY ARTERY OF NATIVE HEART WITHOUT ANGINA PECTORIS: ICD-10-CM

## 2024-05-22 DIAGNOSIS — I35.8 AORTIC VALVE SCLEROSIS: ICD-10-CM

## 2024-05-22 DIAGNOSIS — I47.10 SUPRAVENTRICULAR TACHYCARDIA: ICD-10-CM

## 2024-05-22 DIAGNOSIS — I10 ESSENTIAL (PRIMARY) HYPERTENSION: ICD-10-CM

## 2024-05-22 PROCEDURE — 93000 ELECTROCARDIOGRAM COMPLETE: CPT | Performed by: INTERNAL MEDICINE

## 2024-05-22 PROCEDURE — 99214 OFFICE O/P EST MOD 30 MIN: CPT | Performed by: INTERNAL MEDICINE

## 2024-05-22 RX ORDER — VALSARTAN AND HYDROCHLOROTHIAZIDE 160; 25 MG/1; MG/1
1 TABLET ORAL DAILY
COMMUNITY
Start: 2024-03-07

## 2024-05-22 RX ORDER — ATORVASTATIN CALCIUM 20 MG/1
80 TABLET, FILM COATED ORAL DAILY
Qty: 90 TABLET | Refills: 3 | Status: SHIPPED | OUTPATIENT
Start: 2024-05-22 | End: 2024-05-30

## 2024-05-22 RX ORDER — NIACIN 100 MG
100 TABLET ORAL
COMMUNITY

## 2024-05-22 NOTE — PATIENT INSTRUCTIONS
Cholesterol and Your Health   AMBULATORY CARE:   Cholesterol  is a waxy, fat-like substance. Your body uses cholesterol to make hormones and new cells, and to protect nerves. Cholesterol is made by your body. It also comes from certain foods you eat, such as meat and dairy products. Your healthcare provider can help you set goals for your cholesterol levels. Your provider can help you create a plan to meet your goals.  Cholesterol level goals:  Your cholesterol level goals depend on your risk for heart disease, your age, and your other health conditions. The following are general guidelines:  Total cholesterol  includes low-density lipoprotein (LDL), high-density lipoprotein (HDL), and triglyceride levels. The total cholesterol level should be lower than 200 mg/dL and is best at about 150 mg/dL.    LDL cholesterol  is called bad cholesterol  because it forms plaque in your arteries. As plaque builds up, your arteries become narrow, and less blood flows through. When plaque decreases blood flow to your heart, you may have chest pain. If plaque completely blocks an artery that brings blood to your heart, you may have a heart attack. Plaque can break off and form blood clots. Blood clots may block arteries in your brain and cause a stroke. The level should be less than 130 mg/dL and is best at about 100 mg/dL.         HDL cholesterol  is called good cholesterol  because it helps remove LDL cholesterol from your arteries. It does this by attaching to LDL cholesterol and carrying it to your liver. Your liver breaks down LDL cholesterol so your body can get rid of it. High levels of HDL cholesterol can help prevent a heart attack and stroke. Low levels of HDL cholesterol can increase your risk for heart disease, heart attack, and stroke. The level should be at least 40 mg/dL in males or at least 50 mg/dL in females.    Triglycerides  are a type of fat that store energy from foods you eat. High levels of triglycerides also  cause plaque buildup. This can increase your risk for a heart attack or stroke. If your triglyceride level is high, your LDL cholesterol level may also be high. The level should be less than 150 mg/dL.    Any of the following can increase your risk for high cholesterol:   Smoking or drinking large amounts of alcohol    Having overweight or obesity, or not getting enough exercise    A medical condition such as hypertension (high blood pressure) or diabetes    A family history of high cholesterol    Age older than 65    What you need to know about having your cholesterol levels checked:  Adults 20 to 45 years of age should have their cholesterol levels checked every 4 to 6 years. Adults 45 years or older should have their cholesterol checked every 1 to 2 years. You may need your cholesterol checked more often, or at a younger age, if you have risk factors for heart disease. You may also need to have your cholesterol checked more often if you have other health conditions, such as diabetes. Blood tests are used to check cholesterol levels. Blood tests measure your levels of triglycerides, LDL cholesterol, and HDL cholesterol.  How healthy fats affect your cholesterol levels:  Healthy fats, also called unsaturated fats, help lower LDL cholesterol and triglyceride levels. Healthy fats include the following:  Monounsaturated fats  are found in foods such as olive oil, canola oil, avocado, nuts, and olives.    Polyunsaturated fats,  such as omega 3 fats, are found in fish, such as salmon, trout, and tuna. They can also be found in plant foods such as flaxseed, walnuts, and soybeans.    How unhealthy fats affect your cholesterol levels:  Unhealthy fats increase LDL cholesterol and triglyceride levels. They are found in foods high in cholesterol, saturated fat, and trans fat:  Cholesterol  is found in eggs, dairy, and meat.    Saturated fat  is found in butter, cheese, ice cream, whole milk, and coconut oil. Saturated fat is  also found in meat, such as sausage, hot dogs, and bologna.    Trans fat  is found in liquid oils and is used in fried and baked foods. Foods that contain trans fats include chips, crackers, muffins, sweet rolls, microwave popcorn, and cookies.    Treatment  for high cholesterol will also decrease your risk of heart disease, heart attack, and stroke. Treatment may include any of the following:  Lifestyle changes  may include food, exercise, weight loss, and quitting smoking. You may also need to decrease the amount of alcohol you drink. Your healthcare provider will want you to start with lifestyle changes. Other treatment may be added if lifestyle changes are not enough. Your healthcare provider may recommend you work with a team to manage hyperlipidemia. The team may include medical experts such as a dietitian, an exercise or physical therapist, and a behavior therapist. Your family members may be included in helping you create lifestyle changes.    Medicines  may be given to lower your LDL cholesterol, triglyceride levels, or total cholesterol level. You may need medicines to lower your cholesterol if any of the following is true:    You have a history of stroke, TIA, unstable angina, or a heart attack.    Your LDL cholesterol level is 190 mg/dL or higher.    You are age 40 to 75 years, have diabetes or heart disease risk factors, and your LDL cholesterol is 70 mg/dL or higher.    Supplements  include fish oil, red yeast rice, and garlic. Fish oil may help lower your triglyceride and LDL cholesterol levels. It may also increase your HDL cholesterol level. Red yeast rice may help decrease your total cholesterol level and LDL cholesterol level. Garlic may help lower your total cholesterol level. Do not take any supplements without talking to your healthcare provider.    Food changes you can make to lower your cholesterol levels:  A dietitian can help you create a healthy eating plan. Your dietitian can show you how  to read food labels and choose foods low in saturated fat, trans fats, and cholesterol.     Decrease the total amount of fat you eat.  Choose lean meats, fat-free or 1% fat milk, and low-fat dairy products, such as yogurt and cheese. Try to limit or avoid red meats. Limit or do not eat fried foods or baked goods, such as cookies.    Replace unhealthy fats with healthy fats.  Cook foods in olive oil or canola oil. Choose soft margarines that are low in saturated fat and trans fat. Seeds, nuts, and avocados are other examples of healthy fats.    Eat foods with omega-3 fats.  Examples include salmon, tuna, mackerel, walnuts, and flaxseed. Eat fish 2 times per week. Pregnant women should not eat fish that have high levels of mercury, such as shark, swordfish, and alisson mackerel.         Increase the amount of high-fiber foods you eat.  High-fiber foods can help lower your LDL cholesterol. Aim to get between 20 and 30 grams of fiber each day. Fruits and vegetables are high in fiber. Eat at least 5 servings each day. Other high-fiber foods are whole-grain or whole-wheat breads, pastas, or cereals, and brown rice. Eat 3 ounces of whole-grain foods each day. Increase fiber slowly. You may have abdominal discomfort, bloating, and gas if you add fiber to your diet too quickly.         Eat healthy protein foods.  Examples include low-fat dairy products, skinless chicken and turkey, fish, and nuts.    Limit foods and drinks that are high in sugar.  Your dietitian or healthcare provider can help you create daily limits for high-sugar foods and drinks. The limit may be lower if you have diabetes or another health condition. Limits can also help you lose weight if needed.  Lifestyle changes you can make to lower your cholesterol levels:   Maintain a healthy weight.  Ask your healthcare provider what a healthy weight is for you. Ask your provider to help you create a weight loss plan if needed. Weight loss can decrease your total  cholesterol and triglyceride levels. Weight loss may also help keep your blood pressure at a healthy level.    Be physically active throughout the day.  Physical activity, such as exercise, can help lower your total cholesterol level and maintain a healthy weight. Physical activity can also help increase your HDL cholesterol level. Work with your healthcare provider to create an program that is right for you. Get at least 30 to 40 minutes of moderate physical activity most days of the week. Examples of exercise include brisk walking, swimming, or biking. Also include strength training at least 2 times each week. Your healthcare providers can help you create a physical activity plan.            Do not smoke.  Nicotine and other chemicals in cigarettes and cigars can raise your cholesterol levels. Ask your healthcare provider for information if you currently smoke and need help to quit. E-cigarettes or smokeless tobacco still contain nicotine. Talk to your healthcare provider before you use these products.         Limit or do not drink alcohol.  Alcohol can increase your triglyceride levels. Ask your healthcare provider before you drink alcohol. Ask how much is okay for you to drink in 24 hours or 1 week.    Follow up with your doctor as directed:  Write down your questions so you remember to ask them during your visits.  © Copyright [employer] 2023 Information is for End User's use only and may not be sold, redistributed or otherwise used for commercial purposes.  The above information is an  only. It is not intended as medical advice for individual conditions or treatments. Talk to your doctor, nurse or pharmacist before following any medical regimen to see if it is safe and effective for you.

## 2024-05-22 NOTE — ASSESSMENT & PLAN NOTE
Angina free.  Would like to resume statin, LDL not at goal.  Will start Lipitor but only at 20 mg daily, labs in 8 weeks ordered.  Call with muscles aches/pains.  Cont asa 81 mg QD.

## 2024-05-22 NOTE — PROGRESS NOTES
" Patient ID: Cele Ziegler is a 83 y.o. female.        Plan:      Aortic valve sclerosis  Murmur slightly progressed, Echo at next visit planned.    Coronary artery disease involving native coronary artery of native heart without angina pectoris  Angina free.  Would like to resume statin, LDL not at goal.  Will start Lipitor but only at 20 mg daily, labs in 8 weeks ordered.  Call with muscles aches/pains.  Cont asa 81 mg QD.    Essential (primary) hypertension  Controlled    History of placement of stent in LAD coronary artery  Angina free    Supraventricular tachycardia (HCC)  Quiescent, cont Toprol 25 mg QD       Follow up Plan/Other summary comments:  Return in about 6 months (around 2024).  Call sooner with issues.    HPI: Cele is here for routine FU.  Says you stopped her Lipitor for leg pains, they did get a little better, but not by much.  She says you increased her Toprol to 25 mg QD for her BP which was up.  Denies any CP, SOB, palps, worsening edema, tia or georgiana sx.        Results for orders placed or performed in visit on 24   POCT ECG    Impression    Sinus arrhythmia, abnl R progression, leftward axis, 68 bpm.         Most recent or relevant cardiac/vascular testin NST no ischemia EF 70%   Echo AV slcerosis, EF 60%      Past Surgical History:   Procedure Laterality Date    CARDIAC CATHETERIZATION      (VICTORIA LAD.) - 2010    CHOLECYSTECTOMY      REPLACEMENT TOTAL KNEE BILATERAL         Lipid Profile: Reviewed      Review of Systems   10  point ROS  was otherwise non pertinent or negative except as per HPI or as below.   Arthirtis        Objective:     /78 (BP Location: Left arm, Patient Position: Sitting, Cuff Size: Large)   Pulse 68   Resp 18   Ht 4' 10\" (1.473 m)   Wt 88 kg (194 lb)   SpO2 96%   BMI 40.55 kg/m²     PHYSICAL EXAM:    General:  Normal appearance in no distress.  Eyes:  Anicteric.  Oral mucosa:  Moist.  Neck:  No JVD. Carotid upstrokes are " brisk without bruits.  No masses.  Chest:  Clear to auscultation.  Cardiac:  Regular No palpable PMI.  Normal S1 and S2. Gr 2/6 SE murmur, no gallop or rub.  Abdomen:  Soft and nontender. No palpable organomegaly or aortic enlargement.  Extremities:  Trace B/L LE peripheral edema at ankles  Musculoskeletal:  Symmetric.   Vascular:  Femoral pulses are brisk without bruits.  Popliteal pulses are intact bilaterally.   Pedal pulses are intact.  Neuro:  Grossly symmetric.  Psych:  Alert and oriented x3.      Meds reviewed.    Past Medical History:   Diagnosis Date    CAD (coronary artery disease)      s/p VICTORIA LAD 4/2010    Dyslipidemia     History of cardiovascular stress test     MPI (EF 0.68 (68%), no evidence of ischemia with exercise. No definite scar.) - 9/2/2014    HTN (hypertension)     Hx of echocardiogram     (EF 0.60 (60%), trace mitral  tricuspid regurgitation.) - 9/5/2014    Preop cardiovascular exam     SVT (supraventricular tachycardia)     Type 2 diabetes mellitus (HCC)            Social History     Tobacco Use   Smoking Status Never   Smokeless Tobacco Never

## 2024-05-29 ENCOUNTER — TELEPHONE (OUTPATIENT)
Age: 84
End: 2024-05-29

## 2024-05-29 NOTE — TELEPHONE ENCOUNTER
Insurance will pay for one tablet daily of atorvastatin.  Office notes states patient to start at 20 mg daily. Script was sent as a 20 mg tablet with a dose of 80 mg ( 4 tablets) daily.

## 2024-05-30 DIAGNOSIS — E78.2 MIXED HYPERLIPIDEMIA: ICD-10-CM

## 2024-05-30 DIAGNOSIS — I25.10 CORONARY ARTERY DISEASE INVOLVING NATIVE CORONARY ARTERY OF NATIVE HEART WITHOUT ANGINA PECTORIS: ICD-10-CM

## 2024-05-30 RX ORDER — ATORVASTATIN CALCIUM 20 MG/1
20 TABLET, FILM COATED ORAL DAILY
Qty: 90 TABLET | Refills: 3 | Status: SHIPPED | OUTPATIENT
Start: 2024-05-30

## 2024-07-03 DIAGNOSIS — E78.2 MIXED HYPERLIPIDEMIA: ICD-10-CM

## 2024-07-03 RX ORDER — EZETIMIBE 10 MG/1
TABLET ORAL
Qty: 100 TABLET | Refills: 1 | Status: SHIPPED | OUTPATIENT
Start: 2024-07-03

## 2024-11-22 ENCOUNTER — HOSPITAL ENCOUNTER (OUTPATIENT)
Dept: NON INVASIVE DIAGNOSTICS | Facility: CLINIC | Age: 84
Discharge: HOME | End: 2024-11-22
Payer: COMMERCIAL

## 2024-11-22 VITALS
BODY MASS INDEX: 40.72 KG/M2 | WEIGHT: 194 LBS | HEART RATE: 56 BPM | HEIGHT: 58 IN | SYSTOLIC BLOOD PRESSURE: 136 MMHG | DIASTOLIC BLOOD PRESSURE: 78 MMHG

## 2024-11-22 DIAGNOSIS — I35.8 AORTIC VALVE SCLEROSIS: ICD-10-CM

## 2024-11-22 DIAGNOSIS — I25.10 CORONARY ARTERY DISEASE INVOLVING NATIVE CORONARY ARTERY OF NATIVE HEART WITHOUT ANGINA PECTORIS: ICD-10-CM

## 2024-11-22 LAB
AORTIC ROOT: 3 CM
AORTIC VALVE MEAN VELOCITY: 14.6 M/S
APICAL FOUR CHAMBER EJECTION FRACTION: 64 %
ASCENDING AORTA: 3 CM
AV AREA BY CONTINUOUS VTI: 1.4 CM2
AV AREA PEAK VELOCITY: 1.4 CM2
AV LVOT MEAN GRADIENT: 2 MMHG
AV LVOT PEAK GRADIENT: 4 MMHG
AV MEAN GRADIENT: 10 MMHG
AV PEAK GRADIENT: 18 MMHG
AV VALVE AREA: 1.4 CM2
AV VELOCITY RATIO: 0.45
BSA FOR ECHO PROCEDURE: 1.8 M2
DOP CALC AO PEAK VEL: 2.13 M/S
DOP CALC AO VTI: 54.76 CM
DOP CALC LVOT AREA: 3.14 CM2
DOP CALC LVOT CARDIAC INDEX: 2.4 L/MIN/M2
DOP CALC LVOT CARDIAC OUTPUT: 4.32 L/MIN
DOP CALC LVOT DIAMETER: 2 CM
DOP CALC LVOT PEAK VEL VTI: 24.47 CM
DOP CALC LVOT PEAK VEL: 0.96 M/S
DOP CALC LVOT STROKE INDEX: 43.3 ML/M2
DOP CALC LVOT STROKE VOLUME: 78
E WAVE DECELERATION TIME: 311 MS
E/A RATIO: 0.58
FRACTIONAL SHORTENING: 30 (ref 28–44)
INTERVENTRICULAR SEPTUM IN DIASTOLE (PARASTERNAL SHORT AXIS VIEW): 1.1 CM
INTERVENTRICULAR SEPTUM: 1.1 CM (ref 0.6–1.1)
LAAS-AP2: 18.7 CM2
LAAS-AP4: 19 CM2
LEFT ATRIUM SIZE: 3 CM
LEFT ATRIUM VOLUME (MOD BIPLANE): 52 ML
LEFT ATRIUM VOLUME INDEX (MOD BIPLANE): 28.9 ML/M2
LEFT INTERNAL DIMENSION IN SYSTOLE: 3 CM (ref 2.1–4)
LEFT VENTRICULAR INTERNAL DIMENSION IN DIASTOLE: 4.3 CM (ref 3.5–6)
LEFT VENTRICULAR POSTERIOR WALL IN END DIASTOLE: 1.1 CM
LEFT VENTRICULAR STROKE VOLUME: 50 ML
LVSV (TEICH): 50 ML
MV E'TISSUE VEL-SEP: 7 CM/S
MV PEAK A VEL: 1.16 M/S
MV PEAK E VEL: 67 CM/S
MV STENOSIS PRESSURE HALF TIME: 90 MS
MV VALVE AREA P 1/2 METHOD: 2.4
RIGHT ATRIUM AREA SYSTOLE A4C: 10.6 CM2
RIGHT VENTRICLE ID DIMENSION: 3.6 CM
SL CV LEFT ATRIUM LENGTH A2C: 5.2 CM
SL CV LV EF: 55
SL CV PED ECHO LEFT VENTRICLE DIASTOLIC VOLUME (MOD BIPLANE) 2D: 84 ML
SL CV PED ECHO LEFT VENTRICLE SYSTOLIC VOLUME (MOD BIPLANE) 2D: 35 ML
TR MAX PG: 22 MMHG
TR PEAK VELOCITY: 2.4 M/S
TRICUSPID ANNULAR PLANE SYSTOLIC EXCURSION: 1.8 CM
TRICUSPID VALVE PEAK REGURGITATION VELOCITY: 2.36 M/S

## 2024-11-22 PROCEDURE — 93306 TTE W/DOPPLER COMPLETE: CPT

## 2024-11-22 PROCEDURE — 93306 TTE W/DOPPLER COMPLETE: CPT | Performed by: INTERNAL MEDICINE

## 2024-12-12 DIAGNOSIS — E78.2 MIXED HYPERLIPIDEMIA: ICD-10-CM

## 2024-12-13 RX ORDER — EZETIMIBE 10 MG/1
10 TABLET ORAL DAILY
Qty: 100 TABLET | Refills: 1 | Status: SHIPPED | OUTPATIENT
Start: 2024-12-13

## 2025-01-02 ENCOUNTER — RESULTS FOLLOW-UP (OUTPATIENT)
Dept: CARDIOLOGY CLINIC | Facility: CLINIC | Age: 85
End: 2025-01-02

## 2025-02-05 DIAGNOSIS — I25.10 CORONARY ARTERY DISEASE INVOLVING NATIVE CORONARY ARTERY OF NATIVE HEART WITHOUT ANGINA PECTORIS: ICD-10-CM

## 2025-02-05 DIAGNOSIS — E78.2 MIXED HYPERLIPIDEMIA: ICD-10-CM

## 2025-02-06 RX ORDER — ATORVASTATIN CALCIUM 20 MG/1
20 TABLET, FILM COATED ORAL DAILY
Qty: 100 TABLET | Refills: 0 | Status: SHIPPED | OUTPATIENT
Start: 2025-02-06

## 2025-04-16 DIAGNOSIS — E78.2 MIXED HYPERLIPIDEMIA: ICD-10-CM

## 2025-04-16 DIAGNOSIS — I25.10 CORONARY ARTERY DISEASE INVOLVING NATIVE CORONARY ARTERY OF NATIVE HEART WITHOUT ANGINA PECTORIS: ICD-10-CM

## 2025-04-17 RX ORDER — ATORVASTATIN CALCIUM 20 MG/1
20 TABLET, FILM COATED ORAL DAILY
Qty: 100 TABLET | Refills: 0 | Status: SHIPPED | OUTPATIENT
Start: 2025-04-17

## 2025-05-21 DIAGNOSIS — E78.2 MIXED HYPERLIPIDEMIA: ICD-10-CM

## 2025-05-22 RX ORDER — EZETIMIBE 10 MG/1
10 TABLET ORAL DAILY
Qty: 100 TABLET | Refills: 2 | Status: SHIPPED | OUTPATIENT
Start: 2025-05-22

## 2025-06-25 DIAGNOSIS — E78.2 MIXED HYPERLIPIDEMIA: ICD-10-CM

## 2025-06-25 DIAGNOSIS — I25.10 CORONARY ARTERY DISEASE INVOLVING NATIVE CORONARY ARTERY OF NATIVE HEART WITHOUT ANGINA PECTORIS: ICD-10-CM

## 2025-06-26 RX ORDER — ATORVASTATIN CALCIUM 20 MG/1
20 TABLET, FILM COATED ORAL DAILY
Qty: 100 TABLET | Refills: 0 | Status: SHIPPED | OUTPATIENT
Start: 2025-06-26

## 2025-07-25 ENCOUNTER — OFFICE VISIT (OUTPATIENT)
Dept: CARDIOLOGY CLINIC | Facility: CLINIC | Age: 85
End: 2025-07-25
Payer: COMMERCIAL

## 2025-07-25 VITALS
HEART RATE: 54 BPM | BODY MASS INDEX: 39.88 KG/M2 | SYSTOLIC BLOOD PRESSURE: 132 MMHG | WEIGHT: 190 LBS | HEIGHT: 58 IN | DIASTOLIC BLOOD PRESSURE: 76 MMHG

## 2025-07-25 DIAGNOSIS — I35.0 MILD AORTIC STENOSIS: ICD-10-CM

## 2025-07-25 DIAGNOSIS — I25.10 CORONARY ARTERY DISEASE INVOLVING NATIVE CORONARY ARTERY OF NATIVE HEART WITHOUT ANGINA PECTORIS: Primary | ICD-10-CM

## 2025-07-25 DIAGNOSIS — E78.2 MIXED HYPERLIPIDEMIA: ICD-10-CM

## 2025-07-25 DIAGNOSIS — Z95.5 HISTORY OF PLACEMENT OF STENT IN LAD CORONARY ARTERY: ICD-10-CM

## 2025-07-25 DIAGNOSIS — I47.10 SUPRAVENTRICULAR TACHYCARDIA (HCC): ICD-10-CM

## 2025-07-25 PROCEDURE — 93000 ELECTROCARDIOGRAM COMPLETE: CPT | Performed by: INTERNAL MEDICINE

## 2025-07-25 PROCEDURE — 99214 OFFICE O/P EST MOD 30 MIN: CPT | Performed by: INTERNAL MEDICINE

## 2025-07-25 NOTE — PATIENT INSTRUCTIONS
"Patient Education     Aortic stenosis   The Basics   Written by the doctors and editors at [facility]   What is aortic stenosis? -- Aortic stenosis is a condition in which the aortic valve doesn't open fully (figure 1). The aortic valve is 1 of the 4 heart valves. The heart valves keep blood flowing in only 1 direction. When the heart valves work normally, they open all of the way to let blood flow through them.  Blood flows from a chamber of the heart called the left ventricle, through the aortic valve, into a large blood vessel called the aorta (figure 1). The aorta carries blood to the rest of the body.  In aortic stenosis, the aortic valve gets stuck and does not open fully. This makes the valve opening narrow. When this happens:   Not as much blood can flow out of the heart to the rest of the body.   The heart has to work much harder than usual to pump blood to the rest of the body. Over time, this can cause heart problems.  Aortic stenosis usually happens in adults. But some people are born with it.  What are the symptoms of aortic stenosis? -- Early on, most people have no symptoms. They usually find out that they have aortic stenosis after their doctor or nurse hears a heart murmur on a routine exam. A heart murmur is an extra sound in the heartbeat that doctors or nurses hear when they listen to the heart with a stethoscope.  When people do have symptoms, they can have:   Shortness of breath   Dizziness or fainting   Chest pain  These symptoms usually happen with physical activity. Tell your doctor if you have any of these symptoms.  Is there a test for aortic stenosis? -- Yes. To check for aortic stenosis and see how severe it is, your doctor might order an echocardiogram (or \"echo\"). This test uses sound waves to create a picture of your heart as it beats. It shows the size of your heart chambers, how well your heart is pumping, and how well your heart valves are working (figure 2). If you have aortic " "stenosis, your doctor might repeat this test over time to see if your condition changes.  Your doctor might order a test called an electrocardiogram (\"ECG\"). This test measures the electrical activity in your heart (figure 3).  Some people with aortic stenosis will also have a chest X-ray. A chest X-ray can show the size and shape of your heart. It can also show changes from water in your lungs caused by aortic stenosis or other diseases.  To get more information about your heart, your doctor might order a test called cardiac catheterization (or \"cardiac cath\"). For this, the doctor puts a thin tube into a blood vessel in your leg or arm. Then, they move the tube up to your heart. When the tube is in your heart or blood vessels, the doctor will take measurements. They might also put a dye that shows up on an X-ray into the tube. This can show if any of the arteries in your heart are narrowed or blocked. This part of the test is called \"coronary angiography.\"  How is aortic stenosis treated? -- It depends on your symptoms and how severe your aortic stenosis is. If your aortic stenosis is not severe and you have no symptoms, your doctor will see you regularly. This way, they will know if your aortic stenosis gets worse or if you start to have symptoms.  If your aortic stenosis is severe and you have symptoms, you will likely need treatment. Treatment can include:   A procedure to put in a new valve - This can be done with surgery or without surgery:   During surgery, the doctor will remove your narrowed valve and replace it with a valve that opens normally. This new valve can be made from metal or from tissue from an animal or another person. Your doctor will talk with you about the benefits and downsides of each option.   A procedure to put in a new aortic valve without surgery is called \"transcatheter aortic valve implantation\" (\"CHARI\") or \"transcatheter aortic valve replacement\" (\"TAVR\").   A procedure to open the " "aortic valve - A doctor inflates a balloon in the narrowed aortic valve to try to open it. This procedure is used in children and young adults, because it is helpful in these people. This procedure is usually less helpful in older adults.   Medicines - There are no medicines to treat the aortic valve stenosis itself. Your doctor might prescribe medicines to treat your symptoms. They will also make sure that your blood pressure and cholesterol levels are under control.  Can I play sports? -- You and your doctor should discuss the level of physical activity that is right for you. If your aortic stenosis is mild, you can probably play sports. But if your aortic stenosis is more serious or you have symptoms, your doctor might recommend that you limit your physical activity.  What if I want to get pregnant? -- If you want to get pregnant, talk with your doctor or nurse. Depending on your aortic stenosis and symptoms, they might recommend treating your aortic stenosis before you get pregnant.  All topics are updated as new evidence becomes available and our peer review process is complete.  This topic retrieved from [employer] on: Apr 06, 2024.  Topic 53689 Version 12.0  Release: 32.3.2 - C32.95  © 2024 UpToDate, Inc. and/or its affiliates. All rights reserved.  figure 1: Aortic stenosis     When people have aortic stenosis, the aortic valve does not open fully. This prevents blood from flowing normally from the left ventricle, through the aortic valve, and to the aorta. The direction of blood flow is shown by the black arrow. The aorta is a big blood vessel that carries blood to the rest of the body.  Graphic 15312 Version 2.0  figure 2: Transthoracic echocardiogram (echo)     This picture shows a person getting an echocardiogram (or \"echo\"). To do an echo, a doctor or nurse puts some gel on a person's chest. They press a thick wand (called a \"transducer\") against the chest and move it around. An echo uses sound waves to " "create images of the heart that appear on a computer screen. A test called an electrocardiogram (ECG) is done during an echo. For an ECG, patches (called \"electrodes\") are stuck to a person's chest. Wires run from the patches to a machine that records the electrical activity of the heart.  Graphic 20371 Version 5.0  figure 3: Person having an ECG     This drawing shows a person having an ECG (also called an electrocardiogram or EKG). There are patches, called \"electrodes,\" stuck onto the chest, arms, and legs. Wires run from the electrodes to the ECG machine. An ECG measures the electrical activity in the heart.  Graphic 42841 Version 3.0  Consumer Information Use and Disclaimer   Disclaimer: This generalized information is a limited summary of diagnosis, treatment, and/or medication information. It is not meant to be comprehensive and should be used as a tool to help the user understand and/or assess potential diagnostic and treatment options. It does NOT include all information about conditions, treatments, medications, side effects, or risks that may apply to a specific patient. It is not intended to be medical advice or a substitute for the medical advice, diagnosis, or treatment of a health care provider based on the health care provider's examination and assessment of a patient's specific and unique circumstances. Patients must speak with a health care provider for complete information about their health, medical questions, and treatment options, including any risks or benefits regarding use of medications. This information does not endorse any treatments or medications as safe, effective, or approved for treating a specific patient. UpToDate, Inc. and its affiliates disclaim any warranty or liability relating to this information or the use thereof.The use of this information is governed by the Terms of Use, available at https://www.[employer].com/en/know/clinical-effectiveness-terms. 2024© UpToDate, Inc. " and its affiliates and/or licensors. All rights reserved.  Copyright   © 2024 [employer] and/or its affiliates. All rights reserved.

## 2025-07-25 NOTE — PROGRESS NOTES
Patient ID: Cele Ziegler is a 84 y.o. female.        Plan:      Mild aortic stenosis  Asymptomatic  Will check annual Echo    History of placement of stent in LAD coronary artery  Angina free    Mixed hyperlipidemia  Doing well on statin and Zetia  LDL at 58    Supraventricular tachycardia (HCC)  Quiescent  Cont Toprol 25 mg QD       Follow up Plan/Other summary comments:  There is no evidence for angina, CHF, electrical instability.   Advised no medication changes today.   Return in about 1 year (around 2026).    Call sooner with issues.   HPI: Cele here for routine FU.  Feels well.  Offers no cardiac related complaints on extensive ROS questioning.  No cp, sob, palps, no unusual edema, orthopnea, pnd, (pre)syncope, tia, or claudication like sx.     We reviewed her  Echo    Left Ventricle: Left ventricular cavity size is normal. Wall thickness is mildly increased. The left ventricular ejection fraction is 55%. Systolic function is normal. Wall motion is normal. Diastolic function is mildly abnormal, consistent with grade I (abnormal) relaxation.    Left Atrium: The atrium is mildly dilated.    Aortic Valve: The leaflets are thickened. The leaflets are calcified. There is reduced mobility. There is mild regurgitation. There is mild to moderate stenosis. The aortic valve peak velocity is 2.13 m/s. The aortic valve peak gradient is 18 mmHg. The aortic valve mean gradient is 10 mmHg. The dimensionless velocity index is 0.45. The aortic valve area is 1.40 cm2. The stroke volume index is 43.30 ml/m2.    Mitral Valve: There is annular calcification. There is mild regurgitation.    Tricuspid Valve: There is mild regurgitation.    Results for orders placed or performed in visit on 25   POCT ECG    Impression    Sinus bradycardia, LAD, 55 bpm, oth normal         Most recent or relevant cardiac/vascular testin NST no ischemia EF 70%   Echo AV slcerosis, EF 60%      Past Surgical  "History[1]    Lipid Profile: Reviewed      Review of Systems   10  point ROS  was otherwise non pertinent or negative except as per HPI or as below.         Objective:     /76   Pulse (!) 54   Ht 4' 10\" (1.473 m)   Wt 86.2 kg (190 lb)   BMI 39.71 kg/m²     PHYSICAL EXAM:    General:  Normal appearance in no distress.  Eyes:  Anicteric.  Oral mucosa:  Moist.  Neck:  No JVD. Carotid upstrokes are brisk without bruits.  No masses.  Chest:  Clear to auscultation.  Cardiac:  Regular No palpable PMI.  Normal S1 and S2. Gr 2/6 SE murmur, no gallop or rub.  Abdomen:  Soft and nontender. No palpable organomegaly or aortic enlargement.  Extremities:  Trace B/L LE peripheral edema at ankles  Musculoskeletal:  Symmetric.   Vascular:  Femoral pulses are brisk without bruits.  Popliteal pulses are intact bilaterally.   Pedal pulses are intact.  Neuro:  Grossly symmetric.  Psych:  Alert and oriented x3.      Meds reviewed.  Current Medications[2]     Past Medical History[3]        Tobacco Use History[4]               [1]   Past Surgical History:  Procedure Laterality Date    CARDIAC CATHETERIZATION      ([facility].) - 4/23/2010    CHOLECYSTECTOMY      REPLACEMENT TOTAL KNEE BILATERAL     [2]   Current Outpatient Medications:     ASPIRIN 81 PO, Take 1 tablet by mouth in the morning., Disp: , Rfl:     atorvastatin (LIPITOR) 20 mg tablet, TAKE 1 TABLET BY MOUTH DAILY, Disp: 100 tablet, Rfl: 0    ezetimibe (ZETIA) 10 mg tablet, TAKE 1 TABLET BY MOUTH DAILY, Disp: 100 tablet, Rfl: 2    glipiZIDE (GLUCOTROL) 5 mg tablet, Take 5 mg by mouth in the morning and 5 mg in the evening., Disp: , Rfl:     LANTUS SOLOSTAR 100 units/mL injection pen, , Disp: , Rfl:     metFORMIN (GLUCOPHAGE) 500 mg tablet, Take 500 mg by mouth in the morning and 500 mg in the evening., Disp: , Rfl:     metoprolol succinate (TOPROL-XL) 25 mg 24 hr tablet, Take 25 mg by mouth in the morning., Disp: , Rfl:     niacin 100 mg tablet, Take 100 mg by mouth daily " with breakfast, Disp: , Rfl:     ONE TOUCH ULTRA TEST test strip, , Disp: , Rfl:     valsartan-hydrochlorothiazide (DIOVAN-HCT) 160-25 MG per tablet, Take 1 tablet by mouth in the morning., Disp: , Rfl:   [3]   Past Medical History:  Diagnosis Date    CAD (coronary artery disease)      s/p [facility]IA L[facility] 4/2010    Dyslipidemia     History of cardiovascular stress test     MPI (EF 0.68 (68%), no evidence of ischemia with exercise. No definite scar.) - 9/2/2014    HTN (hypertension)     Hx of echocardiogram     (EF 0.60 (60%), trace mitral  tricuspid regurgitation.) - 9/5/2014    Preop cardiovascular exam     SVT (supraventricular tachycardia) (HCC)     Type 2 diabetes mellitus (HCC)    [4]   Social History  Tobacco Use   Smoking Status Never   Smokeless Tobacco Never